# Patient Record
Sex: MALE | Race: WHITE | Employment: UNEMPLOYED | URBAN - METROPOLITAN AREA
[De-identification: names, ages, dates, MRNs, and addresses within clinical notes are randomized per-mention and may not be internally consistent; named-entity substitution may affect disease eponyms.]

---

## 2018-12-08 ENCOUNTER — APPOINTMENT (OUTPATIENT)
Dept: GENERAL RADIOLOGY | Facility: CLINIC | Age: 53
End: 2018-12-08
Attending: EMERGENCY MEDICINE
Payer: COMMERCIAL

## 2018-12-08 ENCOUNTER — APPOINTMENT (OUTPATIENT)
Dept: CT IMAGING | Facility: CLINIC | Age: 53
End: 2018-12-08
Attending: EMERGENCY MEDICINE
Payer: COMMERCIAL

## 2018-12-08 ENCOUNTER — HOSPITAL ENCOUNTER (EMERGENCY)
Facility: CLINIC | Age: 53
Discharge: ANOTHER HOSPITAL | End: 2018-12-08
Attending: EMERGENCY MEDICINE | Admitting: EMERGENCY MEDICINE
Payer: COMMERCIAL

## 2018-12-08 ENCOUNTER — HOSPITAL ENCOUNTER (INPATIENT)
Facility: CLINIC | Age: 53
LOS: 6 days | Discharge: NURSING, REHAB OR LONG-TERM CARE | End: 2018-12-14
Attending: EMERGENCY MEDICINE | Admitting: SURGERY
Payer: COMMERCIAL

## 2018-12-08 VITALS
OXYGEN SATURATION: 97 % | RESPIRATION RATE: 24 BRPM | SYSTOLIC BLOOD PRESSURE: 134 MMHG | TEMPERATURE: 97.3 F | DIASTOLIC BLOOD PRESSURE: 82 MMHG

## 2018-12-08 DIAGNOSIS — S42.002A CLOSED DISPLACED FRACTURE OF LEFT CLAVICLE, UNSPECIFIED PART OF CLAVICLE, INITIAL ENCOUNTER: ICD-10-CM

## 2018-12-08 DIAGNOSIS — F10.920 ALCOHOLIC INTOXICATION WITHOUT COMPLICATION (H): ICD-10-CM

## 2018-12-08 DIAGNOSIS — S27.0XXA TRAUMATIC PNEUMOTHORAX, INITIAL ENCOUNTER: ICD-10-CM

## 2018-12-08 DIAGNOSIS — F10.120 ALCOHOL ABUSE WITH UNCOMPLICATED INTOXICATION (H): ICD-10-CM

## 2018-12-08 DIAGNOSIS — S22.42XA CLOSED FRACTURE OF MULTIPLE RIBS OF LEFT SIDE, INITIAL ENCOUNTER: ICD-10-CM

## 2018-12-08 DIAGNOSIS — Z86.718 HISTORY OF DEEP VENOUS THROMBOSIS: ICD-10-CM
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
## 2018-12-08 DIAGNOSIS — K59.03 DRUG-INDUCED CONSTIPATION: Primary | ICD-10-CM
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
## 2018-12-08 LAB
ABO + RH BLD: NORMAL
ABO + RH BLD: NORMAL
ALBUMIN SERPL-MCNC: 4.1 G/DL (ref 3.4–5)
ALP SERPL-CCNC: 58 U/L (ref 40–150)
ALT SERPL W P-5'-P-CCNC: 33 U/L (ref 0–70)
ANION GAP SERPL CALCULATED.3IONS-SCNC: 9 MMOL/L (ref 3–14)
APTT PPP: 25 SEC (ref 22–37)
AST SERPL W P-5'-P-CCNC: 46 U/L (ref 0–45)
BASE DEFICIT BLDV-SCNC: 4.4 MMOL/L
BASOPHILS # BLD AUTO: 0 10E9/L (ref 0–0.2)
BASOPHILS # BLD AUTO: 0.1 10E9/L (ref 0–0.2)
BASOPHILS NFR BLD AUTO: 0.1 %
BASOPHILS NFR BLD AUTO: 0.5 %
BILIRUB DIRECT SERPL-MCNC: 0.2 MG/DL (ref 0–0.2)
BILIRUB SERPL-MCNC: 0.7 MG/DL (ref 0.2–1.3)
BLD GP AB SCN SERPL QL: NORMAL
BLOOD BANK CMNT PATIENT-IMP: NORMAL
BUN SERPL-MCNC: 16 MG/DL (ref 7–30)
CALCIUM SERPL-MCNC: 8 MG/DL (ref 8.5–10.1)
CHLORIDE SERPL-SCNC: 110 MMOL/L (ref 94–109)
CO2 SERPL-SCNC: 22 MMOL/L (ref 20–32)
CREAT BLD-MCNC: 1.5 MG/DL (ref 0.66–1.25)
CREAT SERPL-MCNC: 1.2 MG/DL (ref 0.66–1.25)
DIFFERENTIAL METHOD BLD: ABNORMAL
DIFFERENTIAL METHOD BLD: ABNORMAL
EOSINOPHIL # BLD AUTO: 0 10E9/L (ref 0–0.7)
EOSINOPHIL # BLD AUTO: 0.1 10E9/L (ref 0–0.7)
EOSINOPHIL NFR BLD AUTO: 0 %
EOSINOPHIL NFR BLD AUTO: 0.8 %
ERYTHROCYTE [DISTWIDTH] IN BLOOD BY AUTOMATED COUNT: 12.5 % (ref 10–15)
ERYTHROCYTE [DISTWIDTH] IN BLOOD BY AUTOMATED COUNT: 12.8 % (ref 10–15)
ETHANOL SERPL-MCNC: 0.24 G/DL
GFR SERPL CREATININE-BSD FRML MDRD: 49 ML/MIN/1.7M2
GFR SERPL CREATININE-BSD FRML MDRD: 64 ML/MIN/1.7M2
GLUCOSE SERPL-MCNC: 74 MG/DL (ref 70–99)
HCO3 BLDV-SCNC: 23 MMOL/L (ref 21–28)
HCT VFR BLD AUTO: 41.9 % (ref 40–53)
HCT VFR BLD AUTO: 46.4 % (ref 40–53)
HGB BLD-MCNC: 13.9 G/DL (ref 13.3–17.7)
HGB BLD-MCNC: 15.7 G/DL (ref 13.3–17.7)
IMM GRANULOCYTES # BLD: 0.1 10E9/L (ref 0–0.4)
IMM GRANULOCYTES # BLD: 0.1 10E9/L (ref 0–0.4)
IMM GRANULOCYTES NFR BLD: 0.2 %
IMM GRANULOCYTES NFR BLD: 1 %
INR PPP: 0.98 (ref 0.86–1.14)
LYMPHOCYTES # BLD AUTO: 1.3 10E9/L (ref 0.8–5.3)
LYMPHOCYTES # BLD AUTO: 2.4 10E9/L (ref 0.8–5.3)
LYMPHOCYTES NFR BLD AUTO: 19.8 %
LYMPHOCYTES NFR BLD AUTO: 6 %
MCH RBC QN AUTO: 31.3 PG (ref 26.5–33)
MCH RBC QN AUTO: 31.6 PG (ref 26.5–33)
MCHC RBC AUTO-ENTMCNC: 33.2 G/DL (ref 31.5–36.5)
MCHC RBC AUTO-ENTMCNC: 33.8 G/DL (ref 31.5–36.5)
MCV RBC AUTO: 92 FL (ref 78–100)
MCV RBC AUTO: 95 FL (ref 78–100)
MONOCYTES # BLD AUTO: 0.4 10E9/L (ref 0–1.3)
MONOCYTES # BLD AUTO: 0.7 10E9/L (ref 0–1.3)
MONOCYTES NFR BLD AUTO: 1.9 %
MONOCYTES NFR BLD AUTO: 5.5 %
NEUTROPHILS # BLD AUTO: 20.2 10E9/L (ref 1.6–8.3)
NEUTROPHILS # BLD AUTO: 9 10E9/L (ref 1.6–8.3)
NEUTROPHILS NFR BLD AUTO: 72.4 %
NEUTROPHILS NFR BLD AUTO: 91.8 %
NRBC # BLD AUTO: 0 10*3/UL
NRBC # BLD AUTO: 0 10*3/UL
NRBC BLD AUTO-RTO: 0 /100
NRBC BLD AUTO-RTO: 0 /100
O2/TOTAL GAS SETTING VFR VENT: 100 %
PCO2 BLDV: 52 MM HG (ref 40–50)
PH BLDV: 7.26 PH (ref 7.32–7.43)
PLATELET # BLD AUTO: 210 10E9/L (ref 150–450)
PLATELET # BLD AUTO: 280 10E9/L (ref 150–450)
PLATELET # BLD EST: ABNORMAL 10*3/UL
PO2 BLDV: 35 MM HG (ref 25–47)
POTASSIUM SERPL-SCNC: 3.7 MMOL/L (ref 3.4–5.3)
PROT SERPL-MCNC: 7.6 G/DL (ref 6.8–8.8)
RBC # BLD AUTO: 4.4 10E12/L (ref 4.4–5.9)
RBC # BLD AUTO: 5.02 10E12/L (ref 4.4–5.9)
SODIUM SERPL-SCNC: 141 MMOL/L (ref 133–144)
SPECIMEN EXP DATE BLD: NORMAL
TROPONIN I SERPL-MCNC: 0.04 UG/L (ref 0–0.04)
WBC # BLD AUTO: 12.4 10E9/L (ref 4–11)
WBC # BLD AUTO: 22 10E9/L (ref 4–11)

## 2018-12-08 PROCEDURE — 99284 EMERGENCY DEPT VISIT MOD MDM: CPT | Mod: Z6 | Performed by: EMERGENCY MEDICINE

## 2018-12-08 PROCEDURE — 25000128 H RX IP 250 OP 636

## 2018-12-08 PROCEDURE — 86900 BLOOD TYPING SEROLOGIC ABO: CPT | Performed by: EMERGENCY MEDICINE

## 2018-12-08 PROCEDURE — 99285 EMERGENCY DEPT VISIT HI MDM: CPT | Mod: 25 | Performed by: EMERGENCY MEDICINE

## 2018-12-08 PROCEDURE — 74177 CT ABD & PELVIS W/CONTRAST: CPT

## 2018-12-08 PROCEDURE — 0W9B30Z DRAINAGE OF LEFT PLEURAL CAVITY WITH DRAINAGE DEVICE, PERCUTANEOUS APPROACH: ICD-10-PCS | Performed by: EMERGENCY MEDICINE

## 2018-12-08 PROCEDURE — 99292 CRITICAL CARE ADDL 30 MIN: CPT

## 2018-12-08 PROCEDURE — 99291 CRITICAL CARE FIRST HOUR: CPT | Mod: 25

## 2018-12-08 PROCEDURE — 73130 X-RAY EXAM OF HAND: CPT | Mod: 50

## 2018-12-08 PROCEDURE — 84484 ASSAY OF TROPONIN QUANT: CPT | Performed by: EMERGENCY MEDICINE

## 2018-12-08 PROCEDURE — 40000826 ZZH STATISTIC TRAUMA CODE W/O ACCESS

## 2018-12-08 PROCEDURE — 96374 THER/PROPH/DIAG INJ IV PUSH: CPT | Mod: 59

## 2018-12-08 PROCEDURE — 85025 COMPLETE CBC W/AUTO DIFF WBC: CPT | Performed by: EMERGENCY MEDICINE

## 2018-12-08 PROCEDURE — 76604 US EXAM CHEST: CPT

## 2018-12-08 PROCEDURE — 40000986 XR CHEST PORT 1 VW

## 2018-12-08 PROCEDURE — 25000128 H RX IP 250 OP 636: Performed by: STUDENT IN AN ORGANIZED HEALTH CARE EDUCATION/TRAINING PROGRAM

## 2018-12-08 PROCEDURE — 68200001 ZZH PARTIAL TRAUMA W/O CC LEVEL II: Performed by: EMERGENCY MEDICINE

## 2018-12-08 PROCEDURE — 87640 STAPH A DNA AMP PROBE: CPT | Performed by: ANESTHESIOLOGY

## 2018-12-08 PROCEDURE — 90471 IMMUNIZATION ADMIN: CPT

## 2018-12-08 PROCEDURE — 20000004 ZZH R&B ICU UMMC

## 2018-12-08 PROCEDURE — 90715 TDAP VACCINE 7 YRS/> IM: CPT | Performed by: EMERGENCY MEDICINE

## 2018-12-08 PROCEDURE — 86901 BLOOD TYPING SEROLOGIC RH(D): CPT | Performed by: EMERGENCY MEDICINE

## 2018-12-08 PROCEDURE — 96375 TX/PRO/DX INJ NEW DRUG ADDON: CPT

## 2018-12-08 PROCEDURE — 87641 MR-STAPH DNA AMP PROBE: CPT | Performed by: ANESTHESIOLOGY

## 2018-12-08 PROCEDURE — 86850 RBC ANTIBODY SCREEN: CPT | Performed by: EMERGENCY MEDICINE

## 2018-12-08 PROCEDURE — 40000282 ZZH STATISTIC TRAUMA - NO PRIOR

## 2018-12-08 PROCEDURE — 72128 CT CHEST SPINE W/O DYE: CPT

## 2018-12-08 PROCEDURE — 85610 PROTHROMBIN TIME: CPT | Performed by: EMERGENCY MEDICINE

## 2018-12-08 PROCEDURE — 80320 DRUG SCREEN QUANTALCOHOLS: CPT | Performed by: EMERGENCY MEDICINE

## 2018-12-08 PROCEDURE — 96376 TX/PRO/DX INJ SAME DRUG ADON: CPT | Performed by: EMERGENCY MEDICINE

## 2018-12-08 PROCEDURE — 96376 TX/PRO/DX INJ SAME DRUG ADON: CPT

## 2018-12-08 PROCEDURE — 93005 ELECTROCARDIOGRAM TRACING: CPT

## 2018-12-08 PROCEDURE — 72131 CT LUMBAR SPINE W/O DYE: CPT

## 2018-12-08 PROCEDURE — 99291 CRITICAL CARE FIRST HOUR: CPT | Mod: GC | Performed by: ANESTHESIOLOGY

## 2018-12-08 PROCEDURE — 71045 X-RAY EXAM CHEST 1 VIEW: CPT

## 2018-12-08 PROCEDURE — 25000125 ZZHC RX 250: Performed by: EMERGENCY MEDICINE

## 2018-12-08 PROCEDURE — 96375 TX/PRO/DX INJ NEW DRUG ADDON: CPT | Performed by: EMERGENCY MEDICINE

## 2018-12-08 PROCEDURE — 72125 CT NECK SPINE W/O DYE: CPT

## 2018-12-08 PROCEDURE — 25000128 H RX IP 250 OP 636: Performed by: EMERGENCY MEDICINE

## 2018-12-08 PROCEDURE — 82803 BLOOD GASES ANY COMBINATION: CPT | Performed by: EMERGENCY MEDICINE

## 2018-12-08 PROCEDURE — 85730 THROMBOPLASTIN TIME PARTIAL: CPT | Performed by: EMERGENCY MEDICINE

## 2018-12-08 PROCEDURE — 76705 ECHO EXAM OF ABDOMEN: CPT

## 2018-12-08 PROCEDURE — 96361 HYDRATE IV INFUSION ADD-ON: CPT

## 2018-12-08 PROCEDURE — 82565 ASSAY OF CREATININE: CPT

## 2018-12-08 PROCEDURE — 80053 COMPREHEN METABOLIC PANEL: CPT | Performed by: EMERGENCY MEDICINE

## 2018-12-08 PROCEDURE — 70450 CT HEAD/BRAIN W/O DYE: CPT

## 2018-12-08 PROCEDURE — 82248 BILIRUBIN DIRECT: CPT | Performed by: EMERGENCY MEDICINE

## 2018-12-08 PROCEDURE — 40000275 ZZH STATISTIC RCP TIME EA 10 MIN

## 2018-12-08 PROCEDURE — 96374 THER/PROPH/DIAG INJ IV PUSH: CPT | Performed by: EMERGENCY MEDICINE

## 2018-12-08 RX ORDER — AMOXICILLIN 250 MG
1 CAPSULE ORAL 2 TIMES DAILY PRN
Status: DISCONTINUED | OUTPATIENT
Start: 2018-12-08 | End: 2018-12-09

## 2018-12-08 RX ORDER — PROCHLORPERAZINE MALEATE 10 MG
10 TABLET ORAL EVERY 6 HOURS PRN
Status: DISCONTINUED | OUTPATIENT
Start: 2018-12-08 | End: 2018-12-08

## 2018-12-08 RX ORDER — POLYETHYLENE GLYCOL 3350 17 G/17G
17 POWDER, FOR SOLUTION ORAL DAILY
Status: DISCONTINUED | OUTPATIENT
Start: 2018-12-09 | End: 2018-12-10

## 2018-12-08 RX ORDER — SODIUM CHLORIDE 9 MG/ML
INJECTION, SOLUTION INTRAVENOUS CONTINUOUS
Status: DISCONTINUED | OUTPATIENT
Start: 2018-12-08 | End: 2018-12-11

## 2018-12-08 RX ORDER — PROCHLORPERAZINE MALEATE 5 MG
10 TABLET ORAL EVERY 6 HOURS PRN
Status: DISCONTINUED | OUTPATIENT
Start: 2018-12-08 | End: 2018-12-14 | Stop reason: HOSPADM

## 2018-12-08 RX ORDER — ALBUTEROL SULFATE 0.83 MG/ML
2.5 SOLUTION RESPIRATORY (INHALATION) EVERY 4 HOURS PRN
Status: DISCONTINUED | OUTPATIENT
Start: 2018-12-08 | End: 2018-12-14 | Stop reason: HOSPADM

## 2018-12-08 RX ORDER — FENTANYL CITRATE 50 UG/ML
INJECTION, SOLUTION INTRAMUSCULAR; INTRAVENOUS
Status: COMPLETED
Start: 2018-12-08 | End: 2018-12-08

## 2018-12-08 RX ORDER — ONDANSETRON 4 MG/1
4 TABLET, ORALLY DISINTEGRATING ORAL EVERY 6 HOURS PRN
Status: DISCONTINUED | OUTPATIENT
Start: 2018-12-08 | End: 2018-12-08

## 2018-12-08 RX ORDER — ACETAMINOPHEN 325 MG/1
650 TABLET ORAL EVERY 4 HOURS PRN
Status: DISCONTINUED | OUTPATIENT
Start: 2018-12-11 | End: 2018-12-09

## 2018-12-08 RX ORDER — SODIUM CHLORIDE, SODIUM LACTATE, POTASSIUM CHLORIDE, CALCIUM CHLORIDE 600; 310; 30; 20 MG/100ML; MG/100ML; MG/100ML; MG/100ML
INJECTION, SOLUTION INTRAVENOUS CONTINUOUS
Status: DISCONTINUED | OUTPATIENT
Start: 2018-12-08 | End: 2018-12-11

## 2018-12-08 RX ORDER — LIDOCAINE HYDROCHLORIDE AND EPINEPHRINE 10; 10 MG/ML; UG/ML
INJECTION, SOLUTION INFILTRATION; PERINEURAL
Status: DISCONTINUED
Start: 2018-12-08 | End: 2018-12-08 | Stop reason: HOSPADM

## 2018-12-08 RX ORDER — FENTANYL CITRATE 50 UG/ML
50 INJECTION, SOLUTION INTRAMUSCULAR; INTRAVENOUS
Status: COMPLETED | OUTPATIENT
Start: 2018-12-08 | End: 2018-12-08

## 2018-12-08 RX ORDER — AMOXICILLIN 250 MG
2 CAPSULE ORAL 2 TIMES DAILY
Status: DISCONTINUED | OUTPATIENT
Start: 2018-12-08 | End: 2018-12-14 | Stop reason: HOSPADM

## 2018-12-08 RX ORDER — PROCHLORPERAZINE 25 MG
25 SUPPOSITORY, RECTAL RECTAL EVERY 12 HOURS PRN
Status: DISCONTINUED | OUTPATIENT
Start: 2018-12-08 | End: 2018-12-14 | Stop reason: HOSPADM

## 2018-12-08 RX ORDER — SODIUM CHLORIDE, SODIUM LACTATE, POTASSIUM CHLORIDE, CALCIUM CHLORIDE 600; 310; 30; 20 MG/100ML; MG/100ML; MG/100ML; MG/100ML
INJECTION, SOLUTION INTRAVENOUS
Status: COMPLETED
Start: 2018-12-08 | End: 2018-12-08

## 2018-12-08 RX ORDER — ACETAMINOPHEN 325 MG/1
975 TABLET ORAL EVERY 8 HOURS
Status: DISPENSED | OUTPATIENT
Start: 2018-12-08 | End: 2018-12-11

## 2018-12-08 RX ORDER — IOPAMIDOL 755 MG/ML
92 INJECTION, SOLUTION INTRAVASCULAR ONCE
Status: COMPLETED | OUTPATIENT
Start: 2018-12-08 | End: 2018-12-08

## 2018-12-08 RX ORDER — NALOXONE HYDROCHLORIDE 0.4 MG/ML
.1-.4 INJECTION, SOLUTION INTRAMUSCULAR; INTRAVENOUS; SUBCUTANEOUS
Status: DISCONTINUED | OUTPATIENT
Start: 2018-12-08 | End: 2018-12-14 | Stop reason: HOSPADM

## 2018-12-08 RX ORDER — ONDANSETRON 2 MG/ML
4 INJECTION INTRAMUSCULAR; INTRAVENOUS EVERY 6 HOURS PRN
Status: DISCONTINUED | OUTPATIENT
Start: 2018-12-08 | End: 2018-12-08

## 2018-12-08 RX ORDER — ONDANSETRON 2 MG/ML
4 INJECTION INTRAMUSCULAR; INTRAVENOUS EVERY 6 HOURS PRN
Status: DISCONTINUED | OUTPATIENT
Start: 2018-12-08 | End: 2018-12-14 | Stop reason: HOSPADM

## 2018-12-08 RX ORDER — AMOXICILLIN 250 MG
1 CAPSULE ORAL 2 TIMES DAILY
Status: DISCONTINUED | OUTPATIENT
Start: 2018-12-08 | End: 2018-12-14 | Stop reason: HOSPADM

## 2018-12-08 RX ORDER — HYDROMORPHONE HYDROCHLORIDE 1 MG/ML
0.5 INJECTION, SOLUTION INTRAMUSCULAR; INTRAVENOUS; SUBCUTANEOUS
Status: COMPLETED | OUTPATIENT
Start: 2018-12-08 | End: 2018-12-08

## 2018-12-08 RX ORDER — FENTANYL CITRATE 50 UG/ML
50 INJECTION, SOLUTION INTRAMUSCULAR; INTRAVENOUS ONCE
Status: COMPLETED | OUTPATIENT
Start: 2018-12-08 | End: 2018-12-08

## 2018-12-08 RX ORDER — AMOXICILLIN 250 MG
2 CAPSULE ORAL 2 TIMES DAILY PRN
Status: DISCONTINUED | OUTPATIENT
Start: 2018-12-08 | End: 2018-12-09

## 2018-12-08 RX ORDER — NALOXONE HYDROCHLORIDE 0.4 MG/ML
.1-.4 INJECTION, SOLUTION INTRAMUSCULAR; INTRAVENOUS; SUBCUTANEOUS
Status: DISCONTINUED | OUTPATIENT
Start: 2018-12-08 | End: 2018-12-08

## 2018-12-08 RX ORDER — KETAMINE HYDROCHLORIDE 10 MG/ML
10 INJECTION, SOLUTION INTRAMUSCULAR; INTRAVENOUS ONCE
Status: COMPLETED | OUTPATIENT
Start: 2018-12-08 | End: 2018-12-08

## 2018-12-08 RX ORDER — SODIUM CHLORIDE, SODIUM LACTATE, POTASSIUM CHLORIDE, CALCIUM CHLORIDE 600; 310; 30; 20 MG/100ML; MG/100ML; MG/100ML; MG/100ML
1000 INJECTION, SOLUTION INTRAVENOUS CONTINUOUS
Status: DISCONTINUED | OUTPATIENT
Start: 2018-12-08 | End: 2018-12-08

## 2018-12-08 RX ORDER — HYDROMORPHONE HYDROCHLORIDE 1 MG/ML
INJECTION, SOLUTION INTRAMUSCULAR; INTRAVENOUS; SUBCUTANEOUS
Status: COMPLETED
Start: 2018-12-08 | End: 2018-12-08

## 2018-12-08 RX ORDER — FENTANYL CITRATE 50 UG/ML
50 INJECTION, SOLUTION INTRAMUSCULAR; INTRAVENOUS
Status: DISCONTINUED | OUTPATIENT
Start: 2018-12-08 | End: 2018-12-12

## 2018-12-08 RX ORDER — ONDANSETRON 4 MG/1
4 TABLET, ORALLY DISINTEGRATING ORAL EVERY 6 HOURS PRN
Status: DISCONTINUED | OUTPATIENT
Start: 2018-12-08 | End: 2018-12-14 | Stop reason: HOSPADM

## 2018-12-08 RX ORDER — HYDROMORPHONE HYDROCHLORIDE 1 MG/ML
0.5 INJECTION, SOLUTION INTRAMUSCULAR; INTRAVENOUS; SUBCUTANEOUS ONCE
Status: COMPLETED | OUTPATIENT
Start: 2018-12-08 | End: 2018-12-08

## 2018-12-08 RX ADMIN — Medication 1 MG: at 16:29

## 2018-12-08 RX ADMIN — FENTANYL CITRATE 50 MCG: 50 INJECTION, SOLUTION INTRAMUSCULAR; INTRAVENOUS at 19:48

## 2018-12-08 RX ADMIN — SODIUM CHLORIDE, POTASSIUM CHLORIDE, SODIUM LACTATE AND CALCIUM CHLORIDE 2000 ML: 600; 310; 30; 20 INJECTION, SOLUTION INTRAVENOUS at 16:53

## 2018-12-08 RX ADMIN — FENTANYL CITRATE 50 MCG: 50 INJECTION INTRAMUSCULAR; INTRAVENOUS at 17:28

## 2018-12-08 RX ADMIN — FENTANYL CITRATE 50 MCG: 50 INJECTION, SOLUTION INTRAMUSCULAR; INTRAVENOUS at 15:53

## 2018-12-08 RX ADMIN — IOPAMIDOL 92 ML: 755 INJECTION, SOLUTION INTRAVENOUS at 16:28

## 2018-12-08 RX ADMIN — SODIUM CHLORIDE, PRESERVATIVE FREE 63 ML: 5 INJECTION INTRAVENOUS at 16:28

## 2018-12-08 RX ADMIN — FENTANYL CITRATE 50 MCG: 50 INJECTION, SOLUTION INTRAMUSCULAR; INTRAVENOUS at 21:48

## 2018-12-08 RX ADMIN — FENTANYL CITRATE 50 MCG: 50 INJECTION, SOLUTION INTRAMUSCULAR; INTRAVENOUS at 15:49

## 2018-12-08 RX ADMIN — Medication 0.5 MG: at 22:58

## 2018-12-08 RX ADMIN — SODIUM CHLORIDE 2000 ML: 9 INJECTION, SOLUTION INTRAVENOUS at 15:38

## 2018-12-08 RX ADMIN — FENTANYL CITRATE 50 MCG: 50 INJECTION, SOLUTION INTRAMUSCULAR; INTRAVENOUS at 17:28

## 2018-12-08 RX ADMIN — Medication 0.5 MG: at 23:01

## 2018-12-08 RX ADMIN — KETAMINE HYDROCHLORIDE 10 MG: 10 INJECTION, SOLUTION INTRAMUSCULAR; INTRAVENOUS at 19:49

## 2018-12-08 RX ADMIN — FENTANYL CITRATE 50 MCG: 50 INJECTION, SOLUTION INTRAMUSCULAR; INTRAVENOUS at 16:59

## 2018-12-08 RX ADMIN — SODIUM CHLORIDE, POTASSIUM CHLORIDE, SODIUM LACTATE AND CALCIUM CHLORIDE: 600; 310; 30; 20 INJECTION, SOLUTION INTRAVENOUS at 22:58

## 2018-12-08 RX ADMIN — CLOSTRIDIUM TETANI TOXOID ANTIGEN (FORMALDEHYDE INACTIVATED), CORYNEBACTERIUM DIPHTHERIAE TOXOID ANTIGEN (FORMALDEHYDE INACTIVATED), BORDETELLA PERTUSSIS TOXOID ANTIGEN (GLUTARALDEHYDE INACTIVATED), BORDETELLA PERTUSSIS FILAMENTOUS HEMAGGLUTININ ANTIGEN (FORMALDEHYDE INACTIVATED), BORDETELLA PERTUSSIS PERTACTIN ANTIGEN, AND BORDETELLA PERTUSSIS FIMBRIAE 2/3 ANTIGEN 0.5 ML: 5; 2; 2.5; 5; 3; 5 INJECTION, SUSPENSION INTRAMUSCULAR at 16:31

## 2018-12-08 RX ADMIN — Medication 0.5 MG: at 16:01

## 2018-12-08 RX ADMIN — FENTANYL CITRATE 50 MCG: 50 INJECTION, SOLUTION INTRAMUSCULAR; INTRAVENOUS at 17:09

## 2018-12-08 RX ADMIN — Medication: at 22:55

## 2018-12-08 ASSESSMENT — ACTIVITIES OF DAILY LIVING (ADL)
AMBULATION: 0-->INDEPENDENT
RETIRED_EATING: 0-->INDEPENDENT
DRESS: 0-->INDEPENDENT
SWALLOWING: 0-->SWALLOWS FOODS/LIQUIDS WITHOUT DIFFICULTY
RETIRED_COMMUNICATION: 0-->UNDERSTANDS/COMMUNICATES WITHOUT DIFFICULTY
COGNITION: 0 - NO COGNITION ISSUES REPORTED
TRANSFERRING: 0-->INDEPENDENT
FALL_HISTORY_WITHIN_LAST_SIX_MONTHS: NO
TOILETING: 0-->INDEPENDENT
BATHING: 0-->INDEPENDENT

## 2018-12-08 ASSESSMENT — ENCOUNTER SYMPTOMS
ABDOMINAL PAIN: 0
SHORTNESS OF BREATH: 1
VOMITING: 0
WOUND: 1
ABDOMINAL PAIN: 0
NECK PAIN: 1
WOUND: 1
MYALGIAS: 1
BACK PAIN: 1
NAUSEA: 0
ARTHRALGIAS: 1
NUMBNESS: 0
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
## 2018-12-09 ENCOUNTER — APPOINTMENT (OUTPATIENT)
Dept: OCCUPATIONAL THERAPY | Facility: CLINIC | Age: 53
End: 2018-12-09
Attending: ANESTHESIOLOGY
Payer: COMMERCIAL

## 2018-12-09 ENCOUNTER — ANESTHESIA EVENT (OUTPATIENT)
Dept: INTENSIVE CARE | Facility: CLINIC | Age: 53
End: 2018-12-09
Payer: COMMERCIAL

## 2018-12-09 ENCOUNTER — ANESTHESIA (OUTPATIENT)
Dept: INTENSIVE CARE | Facility: CLINIC | Age: 53
End: 2018-12-09
Payer: COMMERCIAL

## 2018-12-09 LAB
ANION GAP SERPL CALCULATED.3IONS-SCNC: 10 MMOL/L (ref 3–14)
BUN SERPL-MCNC: 18 MG/DL (ref 7–30)
CALCIUM SERPL-MCNC: 7.5 MG/DL (ref 8.5–10.1)
CHLORIDE SERPL-SCNC: 100 MMOL/L (ref 94–109)
CO2 SERPL-SCNC: 24 MMOL/L (ref 20–32)
CREAT SERPL-MCNC: 0.95 MG/DL (ref 0.66–1.25)
ERYTHROCYTE [DISTWIDTH] IN BLOOD BY AUTOMATED COUNT: 12.6 % (ref 10–15)
GFR SERPL CREATININE-BSD FRML MDRD: 84 ML/MIN/1.7M2
GGT SERPL-CCNC: 14 U/L (ref 0–75)
GLUCOSE SERPL-MCNC: 72 MG/DL (ref 70–99)
HCT VFR BLD AUTO: 40.5 % (ref 40–53)
HGB BLD-MCNC: 13.6 G/DL (ref 13.3–17.7)
INR PPP: 1.25 (ref 0.86–1.14)
MAGNESIUM SERPL-MCNC: 2 MG/DL (ref 1.6–2.3)
MCH RBC QN AUTO: 31.6 PG (ref 26.5–33)
MCHC RBC AUTO-ENTMCNC: 33.6 G/DL (ref 31.5–36.5)
MCV RBC AUTO: 94 FL (ref 78–100)
MRSA DNA SPEC QL NAA+PROBE: NEGATIVE
PHOSPHATE SERPL-MCNC: 3.9 MG/DL (ref 2.5–4.5)
PLATELET # BLD AUTO: 206 10E9/L (ref 150–450)
POTASSIUM SERPL-SCNC: 4.5 MMOL/L (ref 3.4–5.3)
RBC # BLD AUTO: 4.31 10E12/L (ref 4.4–5.9)
SODIUM SERPL-SCNC: 135 MMOL/L (ref 133–144)
SPECIMEN SOURCE: NORMAL
TROPONIN I SERPL-MCNC: 0.04 UG/L (ref 0–0.04)
WBC # BLD AUTO: 15.8 10E9/L (ref 4–11)

## 2018-12-09 PROCEDURE — 25000132 ZZH RX MED GY IP 250 OP 250 PS 637: Performed by: STUDENT IN AN ORGANIZED HEALTH CARE EDUCATION/TRAINING PROGRAM

## 2018-12-09 PROCEDURE — 25000128 H RX IP 250 OP 636: Performed by: NURSE PRACTITIONER

## 2018-12-09 PROCEDURE — 99233 SBSQ HOSP IP/OBS HIGH 50: CPT | Performed by: NURSE PRACTITIONER

## 2018-12-09 PROCEDURE — 85610 PROTHROMBIN TIME: CPT | Performed by: NURSE PRACTITIONER

## 2018-12-09 PROCEDURE — 40000275 ZZH STATISTIC RCP TIME EA 10 MIN

## 2018-12-09 PROCEDURE — 97165 OT EVAL LOW COMPLEX 30 MIN: CPT | Mod: GO | Performed by: OCCUPATIONAL THERAPIST

## 2018-12-09 PROCEDURE — 40000671 ZZH STATISTIC ANESTHESIA CASE

## 2018-12-09 PROCEDURE — 36415 COLL VENOUS BLD VENIPUNCTURE: CPT | Performed by: NURSE PRACTITIONER

## 2018-12-09 PROCEDURE — 94150 VITAL CAPACITY TEST: CPT

## 2018-12-09 PROCEDURE — 25000125 ZZHC RX 250: Performed by: STUDENT IN AN ORGANIZED HEALTH CARE EDUCATION/TRAINING PROGRAM

## 2018-12-09 PROCEDURE — 25000125 ZZHC RX 250: Performed by: ANESTHESIOLOGY

## 2018-12-09 PROCEDURE — 3E0R3BZ INTRODUCTION OF ANESTHETIC AGENT INTO SPINAL CANAL, PERCUTANEOUS APPROACH: ICD-10-PCS | Performed by: ORTHOPAEDIC SURGERY

## 2018-12-09 PROCEDURE — 84100 ASSAY OF PHOSPHORUS: CPT | Performed by: NURSE PRACTITIONER

## 2018-12-09 PROCEDURE — 20000004 ZZH R&B ICU UMMC

## 2018-12-09 PROCEDURE — 40000133 ZZH STATISTIC OT WARD VISIT: Performed by: OCCUPATIONAL THERAPIST

## 2018-12-09 PROCEDURE — 25000128 H RX IP 250 OP 636: Performed by: EMERGENCY MEDICINE

## 2018-12-09 PROCEDURE — 83735 ASSAY OF MAGNESIUM: CPT | Performed by: NURSE PRACTITIONER

## 2018-12-09 PROCEDURE — 25000132 ZZH RX MED GY IP 250 OP 250 PS 637: Performed by: NURSE PRACTITIONER

## 2018-12-09 PROCEDURE — HZ2ZZZZ DETOXIFICATION SERVICES FOR SUBSTANCE ABUSE TREATMENT: ICD-10-PCS | Performed by: ORTHOPAEDIC SURGERY

## 2018-12-09 PROCEDURE — 80048 BASIC METABOLIC PNL TOTAL CA: CPT | Performed by: NURSE PRACTITIONER

## 2018-12-09 PROCEDURE — 27110038 ZZH RX 271: Performed by: ANESTHESIOLOGY

## 2018-12-09 PROCEDURE — 85027 COMPLETE CBC AUTOMATED: CPT | Performed by: NURSE PRACTITIONER

## 2018-12-09 PROCEDURE — 84484 ASSAY OF TROPONIN QUANT: CPT | Performed by: NURSE PRACTITIONER

## 2018-12-09 PROCEDURE — 82977 ASSAY OF GGT: CPT | Performed by: NURSE PRACTITIONER

## 2018-12-09 PROCEDURE — 25000128 H RX IP 250 OP 636: Performed by: STUDENT IN AN ORGANIZED HEALTH CARE EDUCATION/TRAINING PROGRAM

## 2018-12-09 PROCEDURE — 97110 THERAPEUTIC EXERCISES: CPT | Mod: GO | Performed by: OCCUPATIONAL THERAPIST

## 2018-12-09 RX ORDER — GABAPENTIN 300 MG/1
300 CAPSULE ORAL 3 TIMES DAILY
Status: DISCONTINUED | OUTPATIENT
Start: 2018-12-09 | End: 2018-12-11

## 2018-12-09 RX ORDER — HYDROXYZINE HYDROCHLORIDE 25 MG/1
25 TABLET, FILM COATED ORAL EVERY 6 HOURS PRN
Status: DISCONTINUED | OUTPATIENT
Start: 2018-12-09 | End: 2018-12-14 | Stop reason: HOSPADM

## 2018-12-09 RX ORDER — PANTOPRAZOLE SODIUM 40 MG/1
40 TABLET, DELAYED RELEASE ORAL
Status: DISCONTINUED | OUTPATIENT
Start: 2018-12-09 | End: 2018-12-10

## 2018-12-09 RX ORDER — BUPIVACAINE HYDROCHLORIDE AND EPINEPHRINE 2.5; 5 MG/ML; UG/ML
INJECTION, SOLUTION INFILTRATION; PERINEURAL PRN
Status: DISCONTINUED | OUTPATIENT
Start: 2018-12-09 | End: 2018-12-09

## 2018-12-09 RX ORDER — HYDROXYZINE HYDROCHLORIDE 25 MG/1
50 TABLET, FILM COATED ORAL EVERY 6 HOURS PRN
Status: DISCONTINUED | OUTPATIENT
Start: 2018-12-09 | End: 2018-12-14 | Stop reason: HOSPADM

## 2018-12-09 RX ORDER — METHOCARBAMOL 500 MG/1
1000 TABLET, FILM COATED ORAL 4 TIMES DAILY
Status: DISCONTINUED | OUTPATIENT
Start: 2018-12-09 | End: 2018-12-14 | Stop reason: HOSPADM

## 2018-12-09 RX ORDER — FOLIC ACID 1 MG/1
1 TABLET ORAL DAILY
Status: DISCONTINUED | OUTPATIENT
Start: 2018-12-09 | End: 2018-12-14 | Stop reason: HOSPADM

## 2018-12-09 RX ORDER — MECLIZINE HYDROCHLORIDE 25 MG/1
12.5 TABLET ORAL PRN
COMMUNITY
Start: 2018-10-03 | End: 2019-03-07

## 2018-12-09 RX ORDER — LANOLIN ALCOHOL/MO/W.PET/CERES
200 CREAM (GRAM) TOPICAL DAILY
Status: DISCONTINUED | OUTPATIENT
Start: 2018-12-14 | End: 2018-12-14 | Stop reason: HOSPADM

## 2018-12-09 RX ORDER — MULTIVITAMIN,THERAPEUTIC
1 TABLET ORAL
Status: DISCONTINUED | OUTPATIENT
Start: 2018-12-10 | End: 2018-12-14 | Stop reason: HOSPADM

## 2018-12-09 RX ADMIN — SENNOSIDES AND DOCUSATE SODIUM 2 TABLET: 8.6; 5 TABLET ORAL at 20:58

## 2018-12-09 RX ADMIN — GABAPENTIN 300 MG: 300 CAPSULE ORAL at 14:30

## 2018-12-09 RX ADMIN — BUPIVACAINE HYDROCHLORIDE AND EPINEPHRINE BITARTRATE 30 ML: 2.5; .005 INJECTION, SOLUTION INFILTRATION; PERINEURAL at 13:50

## 2018-12-09 RX ADMIN — THIAMINE HYDROCHLORIDE 250 MG: 100 INJECTION, SOLUTION INTRAMUSCULAR; INTRAVENOUS at 12:53

## 2018-12-09 RX ADMIN — SENNOSIDES AND DOCUSATE SODIUM 1 TABLET: 8.6; 5 TABLET ORAL at 08:00

## 2018-12-09 RX ADMIN — METHOCARBAMOL TABLETS 1000 MG: 500 TABLET, COATED ORAL at 16:35

## 2018-12-09 RX ADMIN — Medication 5 MG/HR: at 01:10

## 2018-12-09 RX ADMIN — METHOCARBAMOL TABLETS 1000 MG: 500 TABLET, COATED ORAL at 20:58

## 2018-12-09 RX ADMIN — POLYETHYLENE GLYCOL 3350 17 G: 17 POWDER, FOR SOLUTION ORAL at 08:00

## 2018-12-09 RX ADMIN — ACETAMINOPHEN 975 MG: 325 TABLET, FILM COATED ORAL at 16:35

## 2018-12-09 RX ADMIN — Medication: at 18:39

## 2018-12-09 RX ADMIN — METHOCARBAMOL TABLETS 1000 MG: 500 TABLET, COATED ORAL at 11:05

## 2018-12-09 RX ADMIN — Medication 5 MG/HR: at 13:07

## 2018-12-09 RX ADMIN — PANTOPRAZOLE SODIUM 40 MG: 40 TABLET, DELAYED RELEASE ORAL at 11:30

## 2018-12-09 RX ADMIN — ACETAMINOPHEN 975 MG: 325 TABLET, FILM COATED ORAL at 08:00

## 2018-12-09 RX ADMIN — FOLIC ACID 1 MG: 1 TABLET ORAL at 12:53

## 2018-12-09 RX ADMIN — HYDROXYZINE HYDROCHLORIDE 25 MG: 25 TABLET ORAL at 11:04

## 2018-12-09 RX ADMIN — GABAPENTIN 300 MG: 300 CAPSULE ORAL at 20:58

## 2018-12-09 RX ADMIN — FENTANYL CITRATE 50 MCG: 50 INJECTION, SOLUTION INTRAMUSCULAR; INTRAVENOUS at 13:31

## 2018-12-09 RX ADMIN — SODIUM CHLORIDE, POTASSIUM CHLORIDE, SODIUM LACTATE AND CALCIUM CHLORIDE: 600; 310; 30; 20 INJECTION, SOLUTION INTRAVENOUS at 11:18

## 2018-12-09 ASSESSMENT — ACTIVITIES OF DAILY LIVING (ADL)
ADLS_ACUITY_SCORE: 15
PREVIOUS_RESPONSIBILITIES: MEAL PREP;HOUSEKEEPING;LAUNDRY;SHOPPING;MEDICATION MANAGEMENT;FINANCES;DRIVING;WORK
ADLS_ACUITY_SCORE: 15

## 2018-12-09 ASSESSMENT — ENCOUNTER SYMPTOMS
NERVOUS/ANXIOUS: 1
NECK STIFFNESS: 1
ACTIVITY CHANGE: 1
BACK PAIN: 1
NEUROLOGICAL NEGATIVE: 1
WOUND: 1
ABDOMINAL PAIN: 1

## 2018-12-09 ASSESSMENT — PAIN DESCRIPTION - DESCRIPTORS: DESCRIPTORS: SORE

## 2018-12-09 ASSESSMENT — LIFESTYLE VARIABLES: TOBACCO_USE: 1
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
## 2018-12-09 NOTE — ANESTHESIA POSTPROCEDURE EVALUATION
Anesthesia POST Procedure Evaluation    Patient: Quang Carolina   MRN:     [id number] Gender:   male   Age:    53 year old :      1965        Preoperative Diagnosis: * No pre-op diagnosis entered *   * No procedures listed *   Postop Comments: No value filed.       Anesthesia Type:  Regional    Reportable Event: NO     PAIN: Uncomplicated   Sign Out status: Comfortable, Well controlled pain     PONV: No PONV   Sign Out status:  No Nausea or Vomiting     Neuro/Psych: Uneventful perioperative course   Sign Out Status: Preoperative baseline; Age appropriate mentation     Airway/Resp.: Uneventful perioperative course   Sign Out Status: Non labored breathing, age appropriate RR; Resp. Status within EXPECTED Parameters     CV: Uneventful perioperative course   Sign Out status: Appropriate BP and perfusion indices; Appropriate HR/Rhythm     Disposition:   Sign Out in:  ICU  Disposition:  ICU  Recovery Course: Recovery in ICU  Follow-Up: Not required           Last Anesthesia Record Vitals:      Last PACU/Preop Vitals:  Vitals:    18 1000 18 1100 18 1200   BP: 119/73 114/70 115/66   Resp:      Temp:   37  C (98.6  F)   SpO2: 95% 97% 95%         Electronically Signed By: Velasquez Castro DO, 2018, 2:09 PM

## 2018-12-09 NOTE — PLAN OF CARE
Discharge Planner OT  4A yanet  Patient plan for discharge: home  Current status: Pt NWB L UE, facilitated up to chair for OOB ADLs with min A and VC for precautions.  Pt c/o severe pain, sling L UE.   Barriers to return to prior living situation: medical needs, precautions  Recommendations for discharge: TCU although may be able to disc  to home w A pending progress and pain control.  Rationale for recommendations: Pt needing assist and compensatory techniques for ADLs       Entered by: Radha Taylor 12/09/2018 4:04 PM

## 2018-12-09 NOTE — ED TRIAGE NOTES
Transfer from [facility]. 18g x2 on bilateral arms. Has received a total of 3.5L of fluid. 1.5 mg total of dilaudid. 250 of fentanyl. SBP in 120's en route. 1mg ativan d/t anxiety given en route. C collar wasn't cleared at [facility] so collar in place. Sats 91% on room air. EMS notes deformity on R hand, stating R hand was not imaged at [facility].
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
## 2018-12-09 NOTE — PROCEDURES
Trauma Surgery Procedure Note  12/08/18    Procedure: L tube thoracostomy  Preprocedure Diagnosis: Rib fractures, traumatic pneumothorax  Postprocedure Diagnosis: Same  Procedure: Left sided chest tube placement .  (32F in 4th intercostal space at anterior axillary line.  Secured at 18 cm.)    Premed: 50 mcg Fentanyl; 10 mg ketamine  Local Anesthesia: 30 ml 1% lidocaine w/epi    After obtaining signed consent, Quang Carolina was prepped and draped in the usual fashion. A timeout was completed to confirm the correct patient and laterality. Lidocaine w/epinephrine was locally injected into the subcutaneous tissue as well as periostium and the pleural space. An incision was made at the skin, and a clamp was used to create a track from the skin to the pleural space. The pleural space was then entered with a Shannon clamp and the chest tube guided in a posterior and superior direction. There was immediate evacuation of approximately 100 cc of blood and a large amount of air. The tube was secured to the skin with 2-0 silk and placed to suction.  The patient tolerated the procedure well, and there were no complications.     A STAT CXR was ordered and confirmed proper placement.    Mir Lopez MD   Trauma [facility]
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
## 2018-12-09 NOTE — ANESTHESIA PROCEDURE NOTES
Peripheral Nerve Block Procedure Note    Staff:     Anesthesiologist:  Velasquez Castro DO    Resident/CRNA:  Marimar Olguin MD    Block performed by resident/CRNA in the presence of a teaching physician    Location: Floor  Procedure Start/Stop TImes:      12/9/2018 1:18 PM     12/9/2018 1:50 PM    patient identified, IV checked, site marked, risks and benefits discussed, informed consent, monitors and equipment checked, pre-op evaluation, at physician/surgeon's request and post-op pain management      Correct Patient: Yes      Correct Position: Yes      Correct Site: Yes      Correct Procedure: Yes      Correct Laterality:  Yes    Site Marked:  Yes  Procedure details:     Procedure:  Other (Erector Spinae)    ASA:  1    Diagnosis:  Rib fractures    Laterality:  Left    Position:  Sitting    Sterile Prep: patient draped, mask and sterile gloves      Local skin infiltration:  2% lidocaine    amount (mL):  2    Insertion Site:  T5-6    Needle:  Touhy needle    Needle gauge:  17    Needle length (inches):  3.1    Catheter gauge:  19    Catheter threaded easily: Yes      Threaded to cm at skin:  7    Ultrasound: Yes      Ultrasound used to identify targeted nerve, plexus, or vascular structure and placed a needle adjacent to it      Permanent Image entered into patiient's record      Abnormal pain on injection: No      Blood Aspirated: No      Paresthesias:  No    Bleeding at site: No      Test dose local:   Lidocaine 1.5% w/ 1:200,000 epinephrine    Test dose negative for signs of intravascular injection: Yes      Bolus via:  Catheter    Infusion Method:  Continuous Infusion    Blood aspirated via catheter: No      Secured:  Dermabond and Tegaderm    Complications:  None  Assessment/Narrative:      Informed consent obtained.  All risks and benefits of the nerve block discussed with the patient.  All questions answered and all parties agreed with the plan.   Block was placed at the surgeon's request for post  operative pain control.

## 2018-12-09 NOTE — PROGRESS NOTES
Neuro- A&Ox4. PERRLA. Moves all extremities. LUE with limited mobility due to fracture. Denies N/T  CV- NSR 80s-ST 100s. No ectopy. BP stable.  Pulm- On 3-4L NC. CT on left with bright red output. Educated re: incentive spirometer and he has been using it without prompting today.   GI- tolerating regular diet. Passing flatus.  - Voids in urinal. Output adequate.   Pain- Started on ketamine drip and dilaudid PCA. Block administered around 1400, pump arrived to floor @1800 and is now running. Will begin to wean ketamine drip. Robaxin and atarax started with good result.

## 2018-12-09 NOTE — ANESTHESIA PREPROCEDURE EVALUATION
Anesthesia Pre-Procedure Evaluation    Patient: Quang Carolina   MRN:     [id number] Gender:   male   Age:    53 year old :      1965        Preoperative Diagnosis: * No pre-op diagnosis entered *   * No procedures listed *     History reviewed. No pertinent past medical history.   History reviewed. No pertinent surgical history.       Anesthesia Evaluation     .             ROS/MED HX    ENT/Pulmonary:     (+)tobacco use, Current use , . .    Neurologic:  - neg neurologic ROS     Cardiovascular:     (+) ----. : . . . :. . Previous cardiac testing date:results:date: results:ECG reviewed date: results:NSR date: results:          METS/Exercise Tolerance:  >4 METS   Hematologic:  - neg hematologic  ROS       Musculoskeletal:  - neg musculoskeletal ROS       GI/Hepatic:  - neg GI/hepatic ROS       Renal/Genitourinary:  - ROS Renal section negative       Endo:  - neg endo ROS       Psychiatric:  - neg psychiatric ROS       Infectious Disease:  - neg infectious disease ROS       Malignancy:      - no malignancy   Other:    - neg other ROS                     PHYSICAL EXAM:   Mental Status/Neuro: A/A/O   Airway: Facies: Feasible  Mallampati: I  Mouth/Opening: Full  TM distance: > 6 cm  Neck ROM: Full   Respiratory: Auscultation: CTAB     Resp. Rate: Normal     Resp. Effort: Normal      CV: Rhythm: Regular  Rate: Age appropriate  Heart: Normal Sounds   Comments:      Dental: Normal                  Lab Results   Component Value Date    WBC 22.0 (H) 2018    HGB 13.9 2018    HCT 41.9 2018     2018     2018    POTASSIUM 3.7 2018    CHLORIDE 110 (H) 2018    CO2 22 2018    BUN 16 2018    CR 1.20 2018    GLC 74 2018    JAYDEN 8.0 (L) 2018    ALBUMIN 4.1 2018    PROTTOTAL 7.6 2018    ALT 33 2018    AST 46 (H) 2018    ALKPHOS 58 2018    BILITOTAL 0.7 2018    PTT 25 2018    INR 0.98 2018  "      Preop Vitals  BP Readings from Last 3 Encounters:   12/09/18 115/66   12/08/18 134/82   02/01/14 120/90    Pulse Readings from Last 3 Encounters:   02/01/14 80   08/25/09 76   04/13/09 76      Resp Readings from Last 3 Encounters:   12/09/18 15   12/08/18 24    SpO2 Readings from Last 3 Encounters:   12/09/18 95%   12/08/18 97%   02/01/14 96%      Temp Readings from Last 1 Encounters:   12/09/18 37  C (98.6  F) (Axillary)    Ht Readings from Last 1 Encounters:   08/25/09 1.803 m (5' 11\")      Wt Readings from Last 1 Encounters:   12/09/18 82.9 kg (182 lb 12.2 oz)    Estimated body mass index is 25.49 kg/m  as calculated from the following:    Height as of 8/25/09: 1.803 m (5' 11\").    Weight as of this encounter: 82.9 kg (182 lb 12.2 oz).     LDA:  Peripheral IV Left Upper forearm (Active)   Site Assessment [facility] 12/9/2018 12:00 PM   Line Status Infusing 12/9/2018 12:00 PM   Phlebitis Scale 0-->no symptoms 12/9/2018 12:00 PM   Infiltration Scale 0 12/9/2018 12:00 PM   Extravasation? No 12/9/2018 12:00 PM   Number of days:        Peripheral IV Right Upper forearm (Active)   Site Assessment L 12/9/2018 12:00 PM   Line Status Infusing 12/9/2018 12:00 PM   Phlebitis Scale 0-->no symptoms 12/9/2018 12:00 PM   Infiltration Scale 0 12/9/2018  8:00 AM   Extravasation? No 12/9/2018 12:00 PM   Number of days:        Chest Tube 1 Left (Active)   Site Assessment UTV 12/9/2018 12:00 PM   Suction -20 cm H2O 12/9/2018 12:00 PM   Chest Tube Airleak No 12/9/2018 12:00 PM   Drainage Description Bright red 12/9/2018 12:00 PM   Dressing Status Normal: Clean, Dry & Intact 12/9/2018 12:00 PM   Dressing Intervention Gauze 12/9/2018 12:00 PM   Patency Intervention Tip/Tilt 12/9/2018 12:00 PM   Chest Tube Clamps at Bedside present 12/9/2018 12:00 PM   Container Amount 330 12/9/2018 12:00 PM   Output (ml) 30 ml 12/9/2018 12:00 PM   Number of days: 1            Assessment:   ASA SCORE: 1    NPO Status: > 6 hours since completed Solid " Foods   Documentation: H&P complete; Preop Testing complete; Consents complete   Proceeding: Proceed without further delay  Tobacco Use:  NO Active use of Tobacco/UNKNOWN Tobacco use status     Plan:   Anes. Type:  Regional     RA Details:  Catheter; L     RA-Location/Type: Plane Block; Erector Spinae   Pre-Induction: None         Access/Monitoring: PIV              PONV Management:  Adult Risk Factors:, Non-Smoker  PONV Prevention Adult: procedure only.     CONSENT: Direct conversation   Plan and risks discussed with: Patient   Blood Products: Consent Deferred (Minimal Blood Loss)       Comments for Plan/Consent:  Erector spinae left block  Procedure only                           Velasquez Castro DO
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
## 2018-12-09 NOTE — PROGRESS NOTES
Barcode scanner is not working. IT called but are not available on Sundays to come fix it. They will be by tomorrow. RN will bypass medications today but will verify controlled substances with another RN before administration.

## 2018-12-10 ENCOUNTER — APPOINTMENT (OUTPATIENT)
Dept: PHYSICAL THERAPY | Facility: CLINIC | Age: 53
End: 2018-12-10
Attending: ANESTHESIOLOGY
Payer: COMMERCIAL

## 2018-12-10 ENCOUNTER — APPOINTMENT (OUTPATIENT)
Dept: GENERAL RADIOLOGY | Facility: CLINIC | Age: 53
End: 2018-12-10
Attending: NURSE PRACTITIONER
Payer: COMMERCIAL

## 2018-12-10 ENCOUNTER — APPOINTMENT (OUTPATIENT)
Dept: GENERAL RADIOLOGY | Facility: CLINIC | Age: 53
End: 2018-12-10
Payer: COMMERCIAL

## 2018-12-10 ENCOUNTER — APPOINTMENT (OUTPATIENT)
Dept: GENERAL RADIOLOGY | Facility: CLINIC | Age: 53
End: 2018-12-10
Attending: SURGERY
Payer: COMMERCIAL

## 2018-12-10 LAB
ANION GAP SERPL CALCULATED.3IONS-SCNC: 7 MMOL/L (ref 3–14)
BUN SERPL-MCNC: 15 MG/DL (ref 7–30)
CALCIUM SERPL-MCNC: 7.8 MG/DL (ref 8.5–10.1)
CHLORIDE SERPL-SCNC: 100 MMOL/L (ref 94–109)
CO2 SERPL-SCNC: 29 MMOL/L (ref 20–32)
CREAT SERPL-MCNC: 0.93 MG/DL (ref 0.66–1.25)
ERYTHROCYTE [DISTWIDTH] IN BLOOD BY AUTOMATED COUNT: 12.7 % (ref 10–15)
GFR SERPL CREATININE-BSD FRML MDRD: 86 ML/MIN/1.7M2
GLUCOSE SERPL-MCNC: 143 MG/DL (ref 70–99)
HCT VFR BLD AUTO: 39 % (ref 40–53)
HGB BLD-MCNC: 13 G/DL (ref 13.3–17.7)
INTERPRETATION ECG - MUSE: NORMAL
MCH RBC QN AUTO: 31.2 PG (ref 26.5–33)
MCHC RBC AUTO-ENTMCNC: 33.3 G/DL (ref 31.5–36.5)
MCV RBC AUTO: 94 FL (ref 78–100)
PLATELET # BLD AUTO: 186 10E9/L (ref 150–450)
POTASSIUM SERPL-SCNC: 4 MMOL/L (ref 3.4–5.3)
RBC # BLD AUTO: 4.17 10E12/L (ref 4.4–5.9)
SODIUM SERPL-SCNC: 137 MMOL/L (ref 133–144)
WBC # BLD AUTO: 12 10E9/L (ref 4–11)

## 2018-12-10 PROCEDURE — 25000132 ZZH RX MED GY IP 250 OP 250 PS 637: Performed by: STUDENT IN AN ORGANIZED HEALTH CARE EDUCATION/TRAINING PROGRAM

## 2018-12-10 PROCEDURE — 94799 UNLISTED PULMONARY SVC/PX: CPT

## 2018-12-10 PROCEDURE — 40000141 ZZH STATISTIC PERIPHERAL IV START W/O US GUIDANCE

## 2018-12-10 PROCEDURE — 36415 COLL VENOUS BLD VENIPUNCTURE: CPT | Performed by: NURSE PRACTITIONER

## 2018-12-10 PROCEDURE — 40000275 ZZH STATISTIC RCP TIME EA 10 MIN

## 2018-12-10 PROCEDURE — 25000128 H RX IP 250 OP 636: Performed by: STUDENT IN AN ORGANIZED HEALTH CARE EDUCATION/TRAINING PROGRAM

## 2018-12-10 PROCEDURE — 40000193 ZZH STATISTIC PT WARD VISIT

## 2018-12-10 PROCEDURE — 85027 COMPLETE CBC AUTOMATED: CPT | Performed by: NURSE PRACTITIONER

## 2018-12-10 PROCEDURE — 25000128 H RX IP 250 OP 636: Performed by: NURSE PRACTITIONER

## 2018-12-10 PROCEDURE — 25000125 ZZHC RX 250: Performed by: NURSE PRACTITIONER

## 2018-12-10 PROCEDURE — 25000125 ZZHC RX 250: Performed by: ANESTHESIOLOGY

## 2018-12-10 PROCEDURE — 25000132 ZZH RX MED GY IP 250 OP 250 PS 637: Performed by: NURSE PRACTITIONER

## 2018-12-10 PROCEDURE — 97162 PT EVAL MOD COMPLEX 30 MIN: CPT | Mod: GP

## 2018-12-10 PROCEDURE — 27110038 ZZH RX 271: Performed by: ANESTHESIOLOGY

## 2018-12-10 PROCEDURE — 27210300 ZZH CANNULA HIGH FLOW, ADULT

## 2018-12-10 PROCEDURE — 97530 THERAPEUTIC ACTIVITIES: CPT | Mod: GP

## 2018-12-10 PROCEDURE — 71045 X-RAY EXAM CHEST 1 VIEW: CPT

## 2018-12-10 PROCEDURE — 73000 X-RAY EXAM OF COLLAR BONE: CPT | Mod: 50

## 2018-12-10 PROCEDURE — 20000004 ZZH R&B ICU UMMC

## 2018-12-10 PROCEDURE — 80048 BASIC METABOLIC PNL TOTAL CA: CPT | Performed by: NURSE PRACTITIONER

## 2018-12-10 PROCEDURE — 71045 X-RAY EXAM CHEST 1 VIEW: CPT | Mod: 77

## 2018-12-10 PROCEDURE — 25000125 ZZHC RX 250: Performed by: STUDENT IN AN ORGANIZED HEALTH CARE EDUCATION/TRAINING PROGRAM

## 2018-12-10 PROCEDURE — 94150 VITAL CAPACITY TEST: CPT

## 2018-12-10 RX ORDER — POTASSIUM CL/LIDO/0.9 % NACL 10MEQ/0.1L
10 INTRAVENOUS SOLUTION, PIGGYBACK (ML) INTRAVENOUS
Status: DISCONTINUED | OUTPATIENT
Start: 2018-12-10 | End: 2018-12-14 | Stop reason: HOSPADM

## 2018-12-10 RX ORDER — KETAMINE HYDROCHLORIDE 10 MG/ML
10 INJECTION, SOLUTION INTRAMUSCULAR; INTRAVENOUS EVERY 6 HOURS PRN
Status: DISCONTINUED | OUTPATIENT
Start: 2018-12-10 | End: 2018-12-11

## 2018-12-10 RX ORDER — BISACODYL 10 MG
10 SUPPOSITORY, RECTAL RECTAL DAILY PRN
Status: DISCONTINUED | OUTPATIENT
Start: 2018-12-10 | End: 2018-12-14 | Stop reason: HOSPADM

## 2018-12-10 RX ORDER — POTASSIUM CHLORIDE 29.8 MG/ML
20 INJECTION INTRAVENOUS
Status: DISCONTINUED | OUTPATIENT
Start: 2018-12-10 | End: 2018-12-14 | Stop reason: HOSPADM

## 2018-12-10 RX ORDER — POLYETHYLENE GLYCOL 3350 17 G/17G
17 POWDER, FOR SOLUTION ORAL 2 TIMES DAILY
Status: DISCONTINUED | OUTPATIENT
Start: 2018-12-10 | End: 2018-12-14 | Stop reason: HOSPADM

## 2018-12-10 RX ORDER — POTASSIUM CHLORIDE 750 MG/1
20-40 TABLET, EXTENDED RELEASE ORAL
Status: DISCONTINUED | OUTPATIENT
Start: 2018-12-10 | End: 2018-12-14 | Stop reason: HOSPADM

## 2018-12-10 RX ORDER — POTASSIUM CHLORIDE 7.45 MG/ML
10 INJECTION INTRAVENOUS
Status: DISCONTINUED | OUTPATIENT
Start: 2018-12-10 | End: 2018-12-14 | Stop reason: HOSPADM

## 2018-12-10 RX ORDER — MAGNESIUM SULFATE HEPTAHYDRATE 40 MG/ML
4 INJECTION, SOLUTION INTRAVENOUS EVERY 4 HOURS PRN
Status: DISCONTINUED | OUTPATIENT
Start: 2018-12-10 | End: 2018-12-14 | Stop reason: HOSPADM

## 2018-12-10 RX ORDER — BUPIVACAINE HYDROCHLORIDE 5 MG/ML
3 INJECTION, SOLUTION EPIDURAL; INTRACAUDAL ONCE
Status: COMPLETED | OUTPATIENT
Start: 2018-12-10 | End: 2018-12-10

## 2018-12-10 RX ORDER — POTASSIUM CHLORIDE 1.5 G/1.58G
20-40 POWDER, FOR SOLUTION ORAL
Status: DISCONTINUED | OUTPATIENT
Start: 2018-12-10 | End: 2018-12-14 | Stop reason: HOSPADM

## 2018-12-10 RX ADMIN — THIAMINE HYDROCHLORIDE 250 MG: 100 INJECTION, SOLUTION INTRAMUSCULAR; INTRAVENOUS at 08:19

## 2018-12-10 RX ADMIN — FOLIC ACID 1 MG: 1 TABLET ORAL at 08:15

## 2018-12-10 RX ADMIN — Medication 5 MG/HR: at 00:09

## 2018-12-10 RX ADMIN — METHOCARBAMOL TABLETS 1000 MG: 500 TABLET, COATED ORAL at 08:16

## 2018-12-10 RX ADMIN — GABAPENTIN 300 MG: 300 CAPSULE ORAL at 13:51

## 2018-12-10 RX ADMIN — METHOCARBAMOL TABLETS 1000 MG: 500 TABLET, COATED ORAL at 11:48

## 2018-12-10 RX ADMIN — SENNOSIDES AND DOCUSATE SODIUM 2 TABLET: 8.6; 5 TABLET ORAL at 08:16

## 2018-12-10 RX ADMIN — ACETAMINOPHEN 975 MG: 325 TABLET, FILM COATED ORAL at 16:21

## 2018-12-10 RX ADMIN — POLYETHYLENE GLYCOL 3350 17 G: 17 POWDER, FOR SOLUTION ORAL at 08:16

## 2018-12-10 RX ADMIN — POLYETHYLENE GLYCOL 3350 17 G: 17 POWDER, FOR SOLUTION ORAL at 20:53

## 2018-12-10 RX ADMIN — ACETAMINOPHEN 975 MG: 325 TABLET, FILM COATED ORAL at 08:15

## 2018-12-10 RX ADMIN — GABAPENTIN 300 MG: 300 CAPSULE ORAL at 08:15

## 2018-12-10 RX ADMIN — Medication 10 MG: at 18:28

## 2018-12-10 RX ADMIN — THERA TABS 1 TABLET: TAB at 08:16

## 2018-12-10 RX ADMIN — SENNOSIDES AND DOCUSATE SODIUM 2 TABLET: 8.6; 5 TABLET ORAL at 20:53

## 2018-12-10 RX ADMIN — ENOXAPARIN SODIUM 30 MG: 30 INJECTION SUBCUTANEOUS at 16:21

## 2018-12-10 RX ADMIN — METHOCARBAMOL TABLETS 1000 MG: 500 TABLET, COATED ORAL at 16:21

## 2018-12-10 RX ADMIN — ACETAMINOPHEN 975 MG: 325 TABLET, FILM COATED ORAL at 00:08

## 2018-12-10 RX ADMIN — Medication: at 16:21

## 2018-12-10 RX ADMIN — Medication: at 00:11

## 2018-12-10 RX ADMIN — GABAPENTIN 300 MG: 300 CAPSULE ORAL at 20:53

## 2018-12-10 RX ADMIN — SODIUM CHLORIDE, POTASSIUM CHLORIDE, SODIUM LACTATE AND CALCIUM CHLORIDE: 600; 310; 30; 20 INJECTION, SOLUTION INTRAVENOUS at 06:09

## 2018-12-10 RX ADMIN — Medication 5 MG/HR: at 10:59

## 2018-12-10 RX ADMIN — BUPIVACAINE HYDROCHLORIDE 15 MG: 5 INJECTION, SOLUTION EPIDURAL; INTRACAUDAL at 10:43

## 2018-12-10 RX ADMIN — METHOCARBAMOL TABLETS 1000 MG: 500 TABLET, COATED ORAL at 20:53

## 2018-12-10 RX ADMIN — PANTOPRAZOLE SODIUM 40 MG: 40 TABLET, DELAYED RELEASE ORAL at 08:15

## 2018-12-10 ASSESSMENT — ENCOUNTER SYMPTOMS
WEAKNESS: 1
BACK PAIN: 1

## 2018-12-10 ASSESSMENT — PAIN DESCRIPTION - DESCRIPTORS
DESCRIPTORS: SORE
DESCRIPTORS: SORE

## 2018-12-10 ASSESSMENT — ACTIVITIES OF DAILY LIVING (ADL)
ADLS_ACUITY_SCORE: 10
ADLS_ACUITY_SCORE: 12
ADLS_ACUITY_SCORE: 10
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
## 2018-12-10 NOTE — PLAN OF CARE
PT - 4A  Discharge Planner PT   Patient plan for discharge: Home  Current status: Pt c/o significant pain. Min A x 1 for supine to sit. CGA for sit to stand from EOB, mod A x 1 from recliner. Static standing, weight shifts, and marching. Pt requiring min A to maintain balance and pt c/o dizziness. Instructed pt on ambulation from bed to recliner and back with HHA/min A, 10ft each way. VSS throughout session.   Barriers to return to prior living situation: Requires assistance with bed mobility, transfers, and gait. Has not attempted stairs yet, 28 to enter apartment.   Recommendations for discharge: TCU  Rationale for recommendations: Current status. Pt would benefit from skilled PT intervention to improve LE strength  and balance,  and independence with bed mobility, transfers, and gait.   Entered by: Sundeep Soares 12/10/2018 12:19 PM
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
## 2018-12-10 NOTE — PROGRESS NOTES
REGIONAL ANESTHESIA PAIN SERVICE CONTINUOUS NERVE INFUSION NOTE  Quang Carolina is a 53 year old male CD #1 s/p No admission procedures for hospital encounter. and placement of left T5-6 erector spinae (ES) catheter for pain control.     SUBJECTIVE:  Interval History: Overnight events: none.  Patient reports moderate pain control with nerve block continuous infusion and current analgesics (see below).  Denies weakness, paresthesias, circumoral numbness, metallic taste or tinnitus. Patient is ambulating with assistance.  Currently tolerating a diet, denies nausea or vomiting.                 Clinically Aligned Pain Assessment (CAPA):   Comfort (How is your pain?): Tolerable with discomfort  Change in Pain (Since your last medication/intervention?): About the same  Pain Control (How are your pain treatments working?):  Partially effective pain control  Functioning (Are you able to do activities to get better?) : Can do most things, but pain gets in the way of some   Sleep (Does your pain management allow you to sleep or rest?): Normal sleep        Antithrombotic/Thrombolytic Therapy ordered:  none     Analgesic Medications:               Medications related to Pain Management (From now, onward)     Start     Dose/Rate Route Frequency Ordered Stop     12/09/18 1545   ROPivacaine 0.2% (NAROPIN) 750 mL in ON-Q C-Bloc select flow ([id number] holds 600-750 mL) single cath disposable pump      14 mL/hr  Irrigation Elastomeric Pump 12/09/18 1543       12/09/18 1400   gabapentin (NEURONTIN) capsule 300 mg      300 mg Oral 3 TIMES DAILY 12/09/18 1144       12/09/18 1200   methocarbamol (ROBAXIN) tablet 1,000 mg      1,000 mg Oral 4 TIMES DAILY 12/09/18 1040       12/09/18 1040   hydrOXYzine (ATARAX) tablet 25 mg      25 mg Oral EVERY 6 HOURS PRN 12/09/18 1040       12/09/18 1040   hydrOXYzine (ATARAX) tablet 50 mg      50 mg Oral EVERY 6 HOURS PRN 12/09/18 1040       12/09/18 0800   polyethylene glycol (MIRALAX/GLYCOLAX) Packet  17 g      17 g Oral DAILY 12/08/18 2222 12/08/18 2315   ketamine 50 mg/mL ADULT (KETALAR) 2 mg/mL in sodium chloride 0.9 % 30 mL ANALGESIA infusion      5 mg/hr  2.5 mL/hr  Intravenous CONTINUOUS 12/08/18 2307 12/08/18 2300   acetaminophen (TYLENOL) tablet 975 mg      975 mg Oral EVERY 8 HOURS 12/08/18 2229 12/11/18 2359 12/08/18 2230   HYDROmorphone (DILAUDID) PCA 1 mg/mL OPIOID NAIVE        Intravenous CONTINUOUS 12/08/18 2229 12/08/18 2229   senna-docusate (SENOKOT-S/PERICOLACE) 8.6-50 MG per tablet 1 tablet      1 tablet Oral 2 TIMES DAILY 12/08/18 2229 12/08/18 2229   senna-docusate (SENOKOT-S/PERICOLACE) 8.6-50 MG per tablet 2 tablet      2 tablet Oral 2 TIMES DAILY 12/08/18 2229 12/08/18 1920   fentaNYL (PF) (SUBLIMAZE) injection 50 mcg     Comments:  DO NOT USE THIS FIELD FOR ADMIN INSTRUCTIONS; INFORMATION DOES NOT SHOW ON MAR. USE THE FIELD ABOVE MARKED ADMIN INSTRUCTIONS    50 mcg Intravenous EVERY 1 HOUR PRN 12/08/18 1920               OBJECTIVE:  Lab results      Recent Labs   Lab Test 12/10/18  0310   WBC 12.0*   RBC 4.17*   HGB 13.0*   HCT 39.0*   MCV 94   MCH 31.2   MCHC 33.3   RDW 12.7                  Lab Results   Component Value Date     INR 1.25 12/09/2018     INR 0.98 12/08/2018     INR 1.86 10/11/2013            Vitals:    Temp:  [98.2  F (36.8  C)-98.8  F (37.1  C)] 98.8  F (37.1  C)  Heart Rate:  [] 86  Resp:  [14-16] 16  BP: (112-155)/(66-97) 112/69  SpO2:  [91 %-97 %] 91 %     Exam:   GEN: alert and no distress  NEURO/MSK: Strength B/L LE 5/5  and overall symmetric  SKIN: left erector spinae (ES) catheter site with dressing c/d/i, no tenderness, erythema, heme, edema        ASSESSMENT/PLAN:    Patient is receiving adequate analgesia with current multimodal therapy including left T5-6 erector spinae (ES) catheter with infusion of Ropivacaine 0.2%  at 14mL/hr.  Motor function and meeting activity goals.  No evidence of adverse side effects  related to local anesthetic.               incrementaly with negative aspirate before and between each mL without complication, no symptoms of local anesthetic toxicity (LAST).  Remained with and assessed patient for 10 min post-injection - MAP>90 at q 5 and q 10 min post injection.  RN aware to continue monitor     BP/P, MAP Q 5 min x 30 min, and assess for any untoward effects to local anesthetic following injection. Page RAPS #[phone] for any questions/concerns, MAP < 60 or  to request pain assess for q 12 hr bolus .        - 1030 continue Ropivacaine 0.2% infusion rate at 14mL/hr, CD #4 - 0036 pt reports significant decrease in rib fx pain after the bolus     - expected change of next On-Q pump is 1-2 days  - antithrombotic/thrombolytic therapy: none. Please contact RAPS (#[phone]) prior to any medication changes  - will continue to follow and adjust as needed      lEiezer Walden MD  Anesthesiologist    RAPS Contact Info (24 hour job code pager is the last 4 digits) For in-house use only:   [employer] phone: [place] [phone], [place] [phone], [facility] [phone], then enter call-back number.    Text: Use "[employer]" on the Intranet <Paging/Directory> tab and enter Jobcode ID.   If no call back at any time, contact the hospital  and ask for RAPS attending or backup
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
## 2018-12-11 ENCOUNTER — APPOINTMENT (OUTPATIENT)
Dept: PHYSICAL THERAPY | Facility: CLINIC | Age: 53
End: 2018-12-11
Payer: COMMERCIAL

## 2018-12-11 ENCOUNTER — APPOINTMENT (OUTPATIENT)
Dept: CT IMAGING | Facility: CLINIC | Age: 53
End: 2018-12-11
Payer: COMMERCIAL

## 2018-12-11 LAB
ABO + RH BLD: NORMAL
ABO + RH BLD: NORMAL
ANION GAP SERPL CALCULATED.3IONS-SCNC: 8 MMOL/L (ref 3–14)
BLD GP AB SCN SERPL QL: NORMAL
BLOOD BANK CMNT PATIENT-IMP: NORMAL
BUN SERPL-MCNC: 9 MG/DL (ref 7–30)
CALCIUM SERPL-MCNC: 8.3 MG/DL (ref 8.5–10.1)
CHLORIDE SERPL-SCNC: 103 MMOL/L (ref 94–109)
CO2 SERPL-SCNC: 28 MMOL/L (ref 20–32)
CREAT SERPL-MCNC: 0.89 MG/DL (ref 0.66–1.25)
ERYTHROCYTE [DISTWIDTH] IN BLOOD BY AUTOMATED COUNT: 12.8 % (ref 10–15)
GFR SERPL CREATININE-BSD FRML MDRD: 90 ML/MIN/1.7M2
GLUCOSE SERPL-MCNC: 130 MG/DL (ref 70–99)
HCT VFR BLD AUTO: 39.9 % (ref 40–53)
HGB BLD-MCNC: 12.9 G/DL (ref 13.3–17.7)
MCH RBC QN AUTO: 31.1 PG (ref 26.5–33)
MCHC RBC AUTO-ENTMCNC: 32.3 G/DL (ref 31.5–36.5)
MCV RBC AUTO: 96 FL (ref 78–100)
PLATELET # BLD AUTO: 161 10E9/L (ref 150–450)
POTASSIUM SERPL-SCNC: 3.9 MMOL/L (ref 3.4–5.3)
RBC # BLD AUTO: 4.15 10E12/L (ref 4.4–5.9)
SODIUM SERPL-SCNC: 138 MMOL/L (ref 133–144)
SPECIMEN EXP DATE BLD: NORMAL
WBC # BLD AUTO: 9.2 10E9/L (ref 4–11)

## 2018-12-11 PROCEDURE — 86850 RBC ANTIBODY SCREEN: CPT | Performed by: STUDENT IN AN ORGANIZED HEALTH CARE EDUCATION/TRAINING PROGRAM

## 2018-12-11 PROCEDURE — 94762 N-INVAS EAR/PLS OXIMTRY CONT: CPT

## 2018-12-11 PROCEDURE — 25000132 ZZH RX MED GY IP 250 OP 250 PS 637: Performed by: NURSE PRACTITIONER

## 2018-12-11 PROCEDURE — 25000128 H RX IP 250 OP 636: Performed by: NURSE PRACTITIONER

## 2018-12-11 PROCEDURE — 25000125 ZZHC RX 250: Performed by: NURSE PRACTITIONER

## 2018-12-11 PROCEDURE — 97116 GAIT TRAINING THERAPY: CPT | Mod: GP

## 2018-12-11 PROCEDURE — 71250 CT THORAX DX C-: CPT

## 2018-12-11 PROCEDURE — 97530 THERAPEUTIC ACTIVITIES: CPT | Mod: GP

## 2018-12-11 PROCEDURE — 40000141 ZZH STATISTIC PERIPHERAL IV START W/O US GUIDANCE

## 2018-12-11 PROCEDURE — 36415 COLL VENOUS BLD VENIPUNCTURE: CPT | Performed by: STUDENT IN AN ORGANIZED HEALTH CARE EDUCATION/TRAINING PROGRAM

## 2018-12-11 PROCEDURE — 25000125 ZZHC RX 250: Performed by: ANESTHESIOLOGY

## 2018-12-11 PROCEDURE — 86900 BLOOD TYPING SEROLOGIC ABO: CPT | Performed by: STUDENT IN AN ORGANIZED HEALTH CARE EDUCATION/TRAINING PROGRAM

## 2018-12-11 PROCEDURE — 25000132 ZZH RX MED GY IP 250 OP 250 PS 637: Performed by: STUDENT IN AN ORGANIZED HEALTH CARE EDUCATION/TRAINING PROGRAM

## 2018-12-11 PROCEDURE — 86901 BLOOD TYPING SEROLOGIC RH(D): CPT | Performed by: STUDENT IN AN ORGANIZED HEALTH CARE EDUCATION/TRAINING PROGRAM

## 2018-12-11 PROCEDURE — 20000004 ZZH R&B ICU UMMC

## 2018-12-11 PROCEDURE — 27110038 ZZH RX 271: Performed by: ANESTHESIOLOGY

## 2018-12-11 PROCEDURE — 40000193 ZZH STATISTIC PT WARD VISIT

## 2018-12-11 PROCEDURE — 85027 COMPLETE CBC AUTOMATED: CPT | Performed by: SURGERY

## 2018-12-11 PROCEDURE — 40000809 ZZH STATISTIC NO DOCUMENTATION TO SUPPORT CHARGE

## 2018-12-11 PROCEDURE — 80048 BASIC METABOLIC PNL TOTAL CA: CPT | Performed by: SURGERY

## 2018-12-11 RX ORDER — GABAPENTIN 400 MG/1
400 CAPSULE ORAL 3 TIMES DAILY
Status: DISCONTINUED | OUTPATIENT
Start: 2018-12-11 | End: 2018-12-13

## 2018-12-11 RX ORDER — GLIPIZIDE 10 MG/1
1 TABLET ORAL 4 TIMES DAILY PRN
Status: DISCONTINUED | OUTPATIENT
Start: 2018-12-11 | End: 2018-12-14 | Stop reason: HOSPADM

## 2018-12-11 RX ORDER — LANOLIN ALCOHOL/MO/W.PET/CERES
3 CREAM (GRAM) TOPICAL AT BEDTIME
Status: DISCONTINUED | OUTPATIENT
Start: 2018-12-11 | End: 2018-12-14 | Stop reason: HOSPADM

## 2018-12-11 RX ORDER — NALOXONE HYDROCHLORIDE 0.4 MG/ML
.1-.4 INJECTION, SOLUTION INTRAMUSCULAR; INTRAVENOUS; SUBCUTANEOUS
Status: DISCONTINUED | OUTPATIENT
Start: 2018-12-11 | End: 2018-12-11

## 2018-12-11 RX ORDER — BISACODYL 10 MG
10 SUPPOSITORY, RECTAL RECTAL ONCE
Status: COMPLETED | OUTPATIENT
Start: 2018-12-11 | End: 2018-12-11

## 2018-12-11 RX ORDER — HYDROMORPHONE HYDROCHLORIDE 1 MG/ML
.3-.5 INJECTION, SOLUTION INTRAMUSCULAR; INTRAVENOUS; SUBCUTANEOUS
Status: DISCONTINUED | OUTPATIENT
Start: 2018-12-11 | End: 2018-12-14 | Stop reason: HOSPADM

## 2018-12-11 RX ORDER — BUPIVACAINE HYDROCHLORIDE 5 MG/ML
3 INJECTION, SOLUTION EPIDURAL; INTRACAUDAL ONCE
Status: COMPLETED | OUTPATIENT
Start: 2018-12-11 | End: 2018-12-11

## 2018-12-11 RX ORDER — OXYCODONE HYDROCHLORIDE 5 MG/1
5-10 TABLET ORAL EVERY 4 HOURS PRN
Status: DISCONTINUED | OUTPATIENT
Start: 2018-12-11 | End: 2018-12-14 | Stop reason: HOSPADM

## 2018-12-11 RX ADMIN — METHOCARBAMOL TABLETS 1000 MG: 500 TABLET, COATED ORAL at 12:39

## 2018-12-11 RX ADMIN — OXYCODONE HYDROCHLORIDE 5 MG: 5 TABLET ORAL at 15:19

## 2018-12-11 RX ADMIN — METHOCARBAMOL TABLETS 1000 MG: 500 TABLET, COATED ORAL at 20:43

## 2018-12-11 RX ADMIN — POLYETHYLENE GLYCOL 3350 17 G: 17 POWDER, FOR SOLUTION ORAL at 08:33

## 2018-12-11 RX ADMIN — GABAPENTIN 400 MG: 400 CAPSULE ORAL at 14:27

## 2018-12-11 RX ADMIN — Medication: at 14:24

## 2018-12-11 RX ADMIN — THERA TABS 1 TABLET: TAB at 08:33

## 2018-12-11 RX ADMIN — SODIUM CHLORIDE, POTASSIUM CHLORIDE, SODIUM LACTATE AND CALCIUM CHLORIDE: 600; 310; 30; 20 INJECTION, SOLUTION INTRAVENOUS at 01:54

## 2018-12-11 RX ADMIN — ACETAMINOPHEN 975 MG: 325 TABLET, FILM COATED ORAL at 08:34

## 2018-12-11 RX ADMIN — ACETAMINOPHEN 975 MG: 325 TABLET, FILM COATED ORAL at 00:17

## 2018-12-11 RX ADMIN — METHOCARBAMOL TABLETS 1000 MG: 500 TABLET, COATED ORAL at 16:16

## 2018-12-11 RX ADMIN — OXYCODONE HYDROCHLORIDE 5 MG: 5 TABLET ORAL at 16:17

## 2018-12-11 RX ADMIN — OXYCODONE HYDROCHLORIDE 10 MG: 5 TABLET ORAL at 20:44

## 2018-12-11 RX ADMIN — ACETAMINOPHEN 975 MG: 325 TABLET, FILM COATED ORAL at 16:16

## 2018-12-11 RX ADMIN — SENNOSIDES AND DOCUSATE SODIUM 2 TABLET: 8.6; 5 TABLET ORAL at 08:34

## 2018-12-11 RX ADMIN — GABAPENTIN 300 MG: 300 CAPSULE ORAL at 08:34

## 2018-12-11 RX ADMIN — ENOXAPARIN SODIUM 30 MG: 30 INJECTION SUBCUTANEOUS at 20:47

## 2018-12-11 RX ADMIN — METHOCARBAMOL TABLETS 1000 MG: 500 TABLET, COATED ORAL at 08:33

## 2018-12-11 RX ADMIN — Medication 10 MG: at 04:54

## 2018-12-11 RX ADMIN — BUPIVACAINE HYDROCHLORIDE 15 MG: 5 INJECTION, SOLUTION EPIDURAL; INTRACAUDAL at 10:34

## 2018-12-11 RX ADMIN — MELATONIN TAB 3 MG 3 MG: 3 TAB at 22:09

## 2018-12-11 RX ADMIN — ENOXAPARIN SODIUM 30 MG: 30 INJECTION SUBCUTANEOUS at 08:36

## 2018-12-11 RX ADMIN — FOLIC ACID 1 MG: 1 TABLET ORAL at 08:34

## 2018-12-11 RX ADMIN — THIAMINE HYDROCHLORIDE 250 MG: 100 INJECTION, SOLUTION INTRAMUSCULAR; INTRAVENOUS at 11:17

## 2018-12-11 RX ADMIN — BISACODYL 10 MG: 10 SUPPOSITORY RECTAL at 15:16

## 2018-12-11 RX ADMIN — GABAPENTIN 400 MG: 400 CAPSULE ORAL at 20:44

## 2018-12-11 ASSESSMENT — ACTIVITIES OF DAILY LIVING (ADL)
ADLS_ACUITY_SCORE: 12

## 2018-12-11 ASSESSMENT — PAIN DESCRIPTION - DESCRIPTORS
DESCRIPTORS: SORE
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
## 2018-12-11 NOTE — PROGRESS NOTES
REGIONAL ANESTHESIA PAIN SERVICE (RAPS) EVALUATION:  - Time: 1040.  Called to evaluate patient for bolus   - Evaluation: Patient reports pain intensity with current therapy 8/10 at rest and NA with activity  General: healthy, alert and no distress  Catheter system integrity: intact  Skin: dressing clean, dry and intact   .  Current vitals: /79, P 67,     - Assessment/Plan    PAIN: moderately well controlled     INTERVENTION: bolus 7 ml 0.25% bupivacaine   Bolus administered    MEDICATION: PF bupivacaine 0.25%, total bolus 7 mL    PROCEDURE: Clinician bolus via ES catheter; administered without complication with negative aspirate before and between each mL.    No symptoms of local anesthetic systemic toxicity (LAST). Remained with and assessed patient for 10 min post-injection. BP, P and MAP stable    POST-PROCEDURE: Bedside RN aware of need to continue BP, P and MAP monitoring Q 10 min for an additional 20 min. Contact RAPS (jobcode ID) if any of the following: patient experiencing any untoward effects, SBP< 90, P < 50 or > 120, MAP < 60       Drew Cleo, DO  CA-2  Anesthesiology Resident    RAPS Contact Info (24 hour job code pager is the last 4 digits) For in-house use only:  [employer] phone: [place] [phone], [employer] [phone], [facility] [phone], then enter call-back number.    Text: Use [employer] on the Intranet <Paging/Directory> tab and enter Jobcode ID.   If no call back at any time, contact the hospital  and ask for RAPS attending or backup

## 2018-12-11 NOTE — PROGRESS NOTES
D: Afebrile, VSS. Alert and oriented x4. I: Dilaudid PCA discontinued. Started on po oxycodone. CT remains to water seal,Epidural bolus per anesthesia. IV saline locked  A: States adequate pain control on oxycodone. Large BM after bisacodyl suppository. Eats 100% of regular diet. Voids adequately. Up in halls with PT and up in chair x3 today. P; Continue plan of care.
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
## 2018-12-11 NOTE — PLAN OF CARE
PT - 4A  Discharge Planner PT   Patient plan for discharge: Not discussed  Current status: Pt c/o of significant R rib pain with bed mobility. Mod A x 1 for supine to sit. Min A x 1 for sit to stand from EOB. Pt impulsive with ambulating before instructed. Gait training x 150ft with R hand on IV pole and on 10L oxyplus mask. Verbal cues for pt to heel strike, avoid forward trunk lean, and take slower breaths. Pt maintained SaO2 >90% throughout session.   Barriers to return to prior living situation: Requires assistance with functional mobility, have not attempted stairs.   Recommendations for discharge: TCU  Rationale for recommendations: Current status.   Entered by: Sundeep Soares 12/11/2018 11:06 AM

## 2018-12-11 NOTE — PLAN OF CARE
OT-4a: Cx- attempted to see pt this PM for OT, but pt declining having recently received suppository.
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
## 2018-12-12 ENCOUNTER — APPOINTMENT (OUTPATIENT)
Dept: GENERAL RADIOLOGY | Facility: CLINIC | Age: 53
End: 2018-12-12
Attending: SURGERY
Payer: COMMERCIAL

## 2018-12-12 ENCOUNTER — APPOINTMENT (OUTPATIENT)
Dept: GENERAL RADIOLOGY | Facility: CLINIC | Age: 53
End: 2018-12-12
Attending: NURSE PRACTITIONER
Payer: COMMERCIAL

## 2018-12-12 ENCOUNTER — APPOINTMENT (OUTPATIENT)
Dept: OCCUPATIONAL THERAPY | Facility: CLINIC | Age: 53
End: 2018-12-12
Payer: COMMERCIAL

## 2018-12-12 PROCEDURE — 94150 VITAL CAPACITY TEST: CPT

## 2018-12-12 PROCEDURE — 40000275 ZZH STATISTIC RCP TIME EA 10 MIN

## 2018-12-12 PROCEDURE — 25000128 H RX IP 250 OP 636: Performed by: NURSE PRACTITIONER

## 2018-12-12 PROCEDURE — 40000983 ZZH STATISTIC HFNC ADULT NON-CPAP

## 2018-12-12 PROCEDURE — 40000133 ZZH STATISTIC OT WARD VISIT: Performed by: OCCUPATIONAL THERAPIST

## 2018-12-12 PROCEDURE — 12000008 ZZH R&B INTERMEDIATE UMMC

## 2018-12-12 PROCEDURE — 25000132 ZZH RX MED GY IP 250 OP 250 PS 637: Performed by: NURSE PRACTITIONER

## 2018-12-12 PROCEDURE — 97535 SELF CARE MNGMENT TRAINING: CPT | Mod: GO | Performed by: OCCUPATIONAL THERAPIST

## 2018-12-12 PROCEDURE — 71045 X-RAY EXAM CHEST 1 VIEW: CPT

## 2018-12-12 PROCEDURE — 25000125 ZZHC RX 250: Performed by: STUDENT IN AN ORGANIZED HEALTH CARE EDUCATION/TRAINING PROGRAM

## 2018-12-12 PROCEDURE — 71045 X-RAY EXAM CHEST 1 VIEW: CPT | Mod: 77

## 2018-12-12 PROCEDURE — 25000125 ZZHC RX 250: Performed by: ANESTHESIOLOGY

## 2018-12-12 PROCEDURE — 27110038 ZZH RX 271: Performed by: ANESTHESIOLOGY

## 2018-12-12 RX ORDER — BUPIVACAINE HYDROCHLORIDE 2.5 MG/ML
5 INJECTION, SOLUTION EPIDURAL; INFILTRATION; INTRACAUDAL ONCE
Status: COMPLETED | OUTPATIENT
Start: 2018-12-12 | End: 2018-12-12

## 2018-12-12 RX ADMIN — OXYCODONE HYDROCHLORIDE 10 MG: 5 TABLET ORAL at 11:53

## 2018-12-12 RX ADMIN — BUPIVACAINE HYDROCHLORIDE 12.5 MG: 2.5 INJECTION, SOLUTION EPIDURAL; INFILTRATION; INTRACAUDAL at 10:25

## 2018-12-12 RX ADMIN — METHOCARBAMOL TABLETS 1000 MG: 500 TABLET, COATED ORAL at 20:34

## 2018-12-12 RX ADMIN — THIAMINE HYDROCHLORIDE 250 MG: 100 INJECTION, SOLUTION INTRAMUSCULAR; INTRAVENOUS at 08:07

## 2018-12-12 RX ADMIN — THERA TABS 1 TABLET: TAB at 08:01

## 2018-12-12 RX ADMIN — OXYCODONE HYDROCHLORIDE 10 MG: 5 TABLET ORAL at 16:16

## 2018-12-12 RX ADMIN — METHOCARBAMOL TABLETS 1000 MG: 500 TABLET, COATED ORAL at 11:53

## 2018-12-12 RX ADMIN — GABAPENTIN 400 MG: 400 CAPSULE ORAL at 14:15

## 2018-12-12 RX ADMIN — OXYCODONE HYDROCHLORIDE 10 MG: 5 TABLET ORAL at 20:34

## 2018-12-12 RX ADMIN — DEXTRAN 70, GLYCERIN, HYPROMELLOSE 1 DROP: 1; 2; 3 SOLUTION/ DROPS OPHTHALMIC at 02:22

## 2018-12-12 RX ADMIN — FOLIC ACID 1 MG: 1 TABLET ORAL at 08:01

## 2018-12-12 RX ADMIN — METHOCARBAMOL TABLETS 1000 MG: 500 TABLET, COATED ORAL at 08:02

## 2018-12-12 RX ADMIN — ENOXAPARIN SODIUM 30 MG: 30 INJECTION SUBCUTANEOUS at 20:34

## 2018-12-12 RX ADMIN — ENOXAPARIN SODIUM 30 MG: 30 INJECTION SUBCUTANEOUS at 08:01

## 2018-12-12 RX ADMIN — Medication: at 08:08

## 2018-12-12 RX ADMIN — OXYCODONE HYDROCHLORIDE 10 MG: 5 TABLET ORAL at 05:07

## 2018-12-12 RX ADMIN — OXYCODONE HYDROCHLORIDE 10 MG: 5 TABLET ORAL at 00:59

## 2018-12-12 RX ADMIN — GABAPENTIN 400 MG: 400 CAPSULE ORAL at 20:34

## 2018-12-12 RX ADMIN — MELATONIN TAB 3 MG 3 MG: 3 TAB at 22:05

## 2018-12-12 RX ADMIN — GABAPENTIN 400 MG: 400 CAPSULE ORAL at 08:01

## 2018-12-12 RX ADMIN — METHOCARBAMOL TABLETS 1000 MG: 500 TABLET, COATED ORAL at 16:16

## 2018-12-12 ASSESSMENT — ACTIVITIES OF DAILY LIVING (ADL)
ADLS_ACUITY_SCORE: 12

## 2018-12-12 ASSESSMENT — PAIN DESCRIPTION - DESCRIPTORS: DESCRIPTORS: SORE

## 2018-12-12 NOTE — PLAN OF CARE
"OT 4AB: Discharge Planner OT   Patient plan for discharge: Pt agreeable to need for TCU at discharge - pt states \"that would be good. I just can't do anything for myself right now\"  Current status: Pt is alert and conversational, somewhat distractible and mildly impulsive but redirectable, mostly limited by pain.  Mod A supine to EOB and LE dressing, CGA sit to stand and ambulating household distances (approx 250ft with 2 standing rest breaks). Unsteady with ambulation at times.  Able to tolerate standing at sink for g/h with set up.  VSS on 2L  Barriers to return to prior living situation: impaired balance, pain, stairs, assistance needed for self cares and mobility  Recommendations for discharge: TCU  Rationale for recommendations: Pt is currently below baseline with regards to mobility and independence with self cares and will benefit from continued skilled therapy intervention to address deficits.         Entered by: Adriana Taylor 12/12/2018 2:24 PM       "

## 2018-12-12 NOTE — PROGRESS NOTES
REGIONAL ANESTHESIA PAIN SERVICE (RAPS) EVALUATION:  - Time: 2120.  Called to evaluate patient for bolus   - Evaluation: Patient reports pain intensity with current therapy 8/10 at rest and NA with activity  General: healthy, alert and no distress  Catheter system integrity: intact  Skin: dressing clean, dry and intact   .  Current vitals: /81, P 70, MAP 93    - Assessment/Plan    PAIN: moderately well controlled     INTERVENTION: bolus 6 ml 0.25% bupivacaine   Bolus administered    MEDICATION: PF bupivacaine 0.25%, total bolus 6 mL    PROCEDURE: Clinician bolus via ES catheter; administered without complication with negative aspirate before and between each mL.    No symptoms of local anesthetic systemic toxicity (LAST). Remained with and assessed patient for 10 min post-injection. BP, P and MAP stable    POST-PROCEDURE: Bedside RN aware of need to continue BP, P and MAP monitoring Q 10 min for an additional 20 min. Contact RAPS (jobcode ID) if any of the following: patient experiencing any untoward effects, SBP< 90, P < 50 or > 120, MAP < 60       Drew Cleo, DO  CA-2  Anesthesiology Resident    RAPS Contact Info (24 hour job code pager is the last 4 digits) For in-house use only:  [facility] phone: [place] [phone], [place] [phone], [facility] [phone], then enter call-back number.    Text: Use Patient Communicator on the Intranet <Paging/Directory> tab and enter Jobcode ID.   If no call back at any time, contact the hospital  and ask for RAPS attending or backup
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
## 2018-12-12 NOTE — PROGRESS NOTES
Left Chest tube removal note     The patient was placed in Semi-humphreys s position.The chest tube was clamped. The dressing was removed, insertion site was cleansed with chlorhexidine.  Sutures were removed. The patient was instructed to take a deep breath and to hold it. The chest tube was retracted and checked for resistance, then pulled in one gooden motion. An occlusive dressing was applied immediately. The patient's vital signs were stable post procedure. A post pull CXR 4 hours from removal was ordered. A CXR should be obtained STAT if signs of respiratory distress, subcutaneous emphysema or bleeding from the site should occur.     Please page job code [phone] with any concerns.   Johnathon Merlos CNP
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
## 2018-12-12 NOTE — PLAN OF CARE
Pt on 4A     D/I/A:  Neuro: A&OX4, moves all extremities, PRN oxy 10mg Q4. Ropivacaine bolus per anesthesia.  Baseline tremor  Cardiac: NSR to SB, no ectopy, BP stable  Resp: HFNC 40%,  CT to water seal, weak cough   GI: BM X1 overnight  : Good urine output, voids spontaneously.   Access: PIV X2 SL     P: Transfer to the floor, Pulmonary Toilet, continue to monitor, Encourage activity, update MD with changes and concerns.   See flowsheets for additional documentation

## 2018-12-13 ENCOUNTER — APPOINTMENT (OUTPATIENT)
Dept: OCCUPATIONAL THERAPY | Facility: CLINIC | Age: 53
End: 2018-12-13
Payer: COMMERCIAL

## 2018-12-13 LAB
ANION GAP SERPL CALCULATED.3IONS-SCNC: 6 MMOL/L (ref 3–14)
BUN SERPL-MCNC: 16 MG/DL (ref 7–30)
CALCIUM SERPL-MCNC: 9 MG/DL (ref 8.5–10.1)
CHLORIDE SERPL-SCNC: 100 MMOL/L (ref 94–109)
CO2 SERPL-SCNC: 30 MMOL/L (ref 20–32)
CREAT SERPL-MCNC: 0.91 MG/DL (ref 0.66–1.25)
ERYTHROCYTE [DISTWIDTH] IN BLOOD BY AUTOMATED COUNT: 12.9 % (ref 10–15)
GFR SERPL CREATININE-BSD FRML MDRD: 88 ML/MIN/1.7M2
GLUCOSE SERPL-MCNC: 99 MG/DL (ref 70–99)
HCT VFR BLD AUTO: 45.1 % (ref 40–53)
HGB BLD-MCNC: 15 G/DL (ref 13.3–17.7)
MCH RBC QN AUTO: 31.6 PG (ref 26.5–33)
MCHC RBC AUTO-ENTMCNC: 33.3 G/DL (ref 31.5–36.5)
MCV RBC AUTO: 95 FL (ref 78–100)
PLATELET # BLD AUTO: 234 10E9/L (ref 150–450)
POTASSIUM SERPL-SCNC: 4.2 MMOL/L (ref 3.4–5.3)
RBC # BLD AUTO: 4.75 10E12/L (ref 4.4–5.9)
SODIUM SERPL-SCNC: 137 MMOL/L (ref 133–144)
WBC # BLD AUTO: 7.9 10E9/L (ref 4–11)

## 2018-12-13 PROCEDURE — 85027 COMPLETE CBC AUTOMATED: CPT | Performed by: NURSE PRACTITIONER

## 2018-12-13 PROCEDURE — 27110038 ZZH RX 271: Performed by: ANESTHESIOLOGY

## 2018-12-13 PROCEDURE — 25000132 ZZH RX MED GY IP 250 OP 250 PS 637: Performed by: STUDENT IN AN ORGANIZED HEALTH CARE EDUCATION/TRAINING PROGRAM

## 2018-12-13 PROCEDURE — 25000132 ZZH RX MED GY IP 250 OP 250 PS 637: Performed by: NURSE PRACTITIONER

## 2018-12-13 PROCEDURE — 25000128 H RX IP 250 OP 636: Performed by: NURSE PRACTITIONER

## 2018-12-13 PROCEDURE — 25000125 ZZHC RX 250: Performed by: ANESTHESIOLOGY

## 2018-12-13 PROCEDURE — 40000275 ZZH STATISTIC RCP TIME EA 10 MIN: Performed by: OPTOMETRIST

## 2018-12-13 PROCEDURE — 12000008 ZZH R&B INTERMEDIATE UMMC

## 2018-12-13 PROCEDURE — 94150 VITAL CAPACITY TEST: CPT | Performed by: OPTOMETRIST

## 2018-12-13 PROCEDURE — 80048 BASIC METABOLIC PNL TOTAL CA: CPT | Performed by: NURSE PRACTITIONER

## 2018-12-13 PROCEDURE — 99233 SBSQ HOSP IP/OBS HIGH 50: CPT | Performed by: NURSE PRACTITIONER

## 2018-12-13 PROCEDURE — 97530 THERAPEUTIC ACTIVITIES: CPT | Mod: GO | Performed by: OCCUPATIONAL THERAPIST

## 2018-12-13 PROCEDURE — 36415 COLL VENOUS BLD VENIPUNCTURE: CPT | Performed by: NURSE PRACTITIONER

## 2018-12-13 PROCEDURE — 40000133 ZZH STATISTIC OT WARD VISIT: Performed by: OCCUPATIONAL THERAPIST

## 2018-12-13 RX ORDER — IBUPROFEN 400 MG/1
400 TABLET, FILM COATED ORAL EVERY 6 HOURS PRN
Status: DISCONTINUED | OUTPATIENT
Start: 2018-12-13 | End: 2018-12-14

## 2018-12-13 RX ORDER — ACETAMINOPHEN 500 MG
1000 TABLET ORAL EVERY 8 HOURS
Status: DISCONTINUED | OUTPATIENT
Start: 2018-12-13 | End: 2018-12-14 | Stop reason: HOSPADM

## 2018-12-13 RX ORDER — GABAPENTIN 300 MG/1
600 CAPSULE ORAL 3 TIMES DAILY
Status: DISCONTINUED | OUTPATIENT
Start: 2018-12-13 | End: 2018-12-14 | Stop reason: HOSPADM

## 2018-12-13 RX ADMIN — POLYETHYLENE GLYCOL 3350 17 G: 17 POWDER, FOR SOLUTION ORAL at 20:58

## 2018-12-13 RX ADMIN — MENTHOL 1 PATCH: 205.5 PATCH TOPICAL at 11:53

## 2018-12-13 RX ADMIN — ACETAMINOPHEN 1000 MG: 500 TABLET, FILM COATED ORAL at 13:56

## 2018-12-13 RX ADMIN — OXYCODONE HYDROCHLORIDE 10 MG: 5 TABLET ORAL at 09:20

## 2018-12-13 RX ADMIN — METHOCARBAMOL TABLETS 1000 MG: 500 TABLET, COATED ORAL at 20:59

## 2018-12-13 RX ADMIN — THERA TABS 1 TABLET: TAB at 08:16

## 2018-12-13 RX ADMIN — OXYCODONE HYDROCHLORIDE 10 MG: 5 TABLET ORAL at 18:24

## 2018-12-13 RX ADMIN — ENOXAPARIN SODIUM 30 MG: 30 INJECTION SUBCUTANEOUS at 20:58

## 2018-12-13 RX ADMIN — THIAMINE HYDROCHLORIDE 250 MG: 100 INJECTION, SOLUTION INTRAMUSCULAR; INTRAVENOUS at 08:27

## 2018-12-13 RX ADMIN — GABAPENTIN 400 MG: 400 CAPSULE ORAL at 08:16

## 2018-12-13 RX ADMIN — FOLIC ACID 1 MG: 1 TABLET ORAL at 08:16

## 2018-12-13 RX ADMIN — OXYCODONE HYDROCHLORIDE 10 MG: 5 TABLET ORAL at 22:06

## 2018-12-13 RX ADMIN — SENNOSIDES AND DOCUSATE SODIUM 1 TABLET: 8.6; 5 TABLET ORAL at 20:59

## 2018-12-13 RX ADMIN — OXYCODONE HYDROCHLORIDE 10 MG: 5 TABLET ORAL at 14:15

## 2018-12-13 RX ADMIN — METHOCARBAMOL TABLETS 1000 MG: 500 TABLET, COATED ORAL at 08:16

## 2018-12-13 RX ADMIN — METHOCARBAMOL TABLETS 1000 MG: 500 TABLET, COATED ORAL at 12:13

## 2018-12-13 RX ADMIN — OXYCODONE HYDROCHLORIDE 10 MG: 5 TABLET ORAL at 05:16

## 2018-12-13 RX ADMIN — IBUPROFEN 400 MG: 400 TABLET ORAL at 16:16

## 2018-12-13 RX ADMIN — ENOXAPARIN SODIUM 30 MG: 30 INJECTION SUBCUTANEOUS at 08:16

## 2018-12-13 RX ADMIN — METHOCARBAMOL TABLETS 1000 MG: 500 TABLET, COATED ORAL at 16:16

## 2018-12-13 RX ADMIN — GABAPENTIN 600 MG: 300 CAPSULE ORAL at 13:56

## 2018-12-13 RX ADMIN — MELATONIN TAB 3 MG 3 MG: 3 TAB at 22:06

## 2018-12-13 RX ADMIN — GABAPENTIN 600 MG: 300 CAPSULE ORAL at 20:59

## 2018-12-13 RX ADMIN — OXYCODONE HYDROCHLORIDE 10 MG: 5 TABLET ORAL at 00:26

## 2018-12-13 RX ADMIN — ACETAMINOPHEN 1000 MG: 500 TABLET, FILM COATED ORAL at 22:06

## 2018-12-13 ASSESSMENT — ACTIVITIES OF DAILY LIVING (ADL)
ADLS_ACUITY_SCORE: 12

## 2018-12-13 NOTE — PROGRESS NOTES
Provider update:    Left Erector spinae catheter was pulled at 4:06 PM on 12/13/18. Blue catheter tip was intact and no bleeding at site.   For more information please refer to prior note from RAPS on this day.     Aneesh Carmona MD  CA-3/PGY-4

## 2018-12-13 NOTE — PROVIDER NOTIFICATION
Pt c/o numbness and tingling to LUE, mostly to left pinky and ring finger. ON-Q clamped. Anesthesia notified, and called back saying they will come to assess.
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
## 2018-12-13 NOTE — ADDENDUM NOTE
Addendum  created 12/13/18 1505 by Rickie Cabrales MD    Cosign clinical note with attestation, Sign clinical note

## 2018-12-13 NOTE — PLAN OF CARE
Patient AVSS. On Q pain pump titrated down this am. Oxycodone and muscle relaxer given. Hot/Ice packs alternated to left shoulder and side. Bruising present. Moderate pain w/ movement. Abdominal binder ordered. CMS intact. Activity encouraged, patient declined oob this am.    Patient ambulated x 2 this afternoon. On Q dc'd around 4 pm per anesthesia. Site dry and intact.
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
## 2018-12-13 NOTE — PROGRESS NOTES
REGIONAL ANESTHESIA PAIN SERVICE ([facility]) EVALUATION:  - Time: 2145.  Called to evaluate patient for bolus   - Evaluation: Patient reports pain intensity with current therapy 8/10 at rest and NA with activity  General: healthy, alert and no distress  Catheter system integrity: intact  Skin: dressing clean, dry and intact   .  Current vitals: /75, P 90, MAP 92    - Assessment/Plan    PAIN: moderately well controlled     INTERVENTION: bolus 7 ml 0.25% bupivacaine   Bolus administered    MEDICATION: PF bupivacaine 0.25%, total bolus 7 mL    PROCEDURE: Clinician bolus via ES catheter; administered without complication with negative aspirate before and between each mL.    No symptoms of local anesthetic systemic toxicity (LAST). Remained with and assessed patient for 10 min post-injection. BP, P and MAP stable    POST-PROCEDURE: Bedside RN aware of need to continue BP, P and MAP monitoring Q 10 min for an additional 20 min. Contact [facility] (jobcode ID) if any of the following: patient experiencing any untoward effects, SBP< 90, P < 50 or > 120, MAP < 60       Drew Cleo, DO  CA-2  Anesthesiology Resident    [facility] Contact Info (24 hour job code pager is the last 4 digits) For in-house use only:  [employer] phone: [place] [phone], [place] [phone], [facility] [phone], then enter call-back number.    Text: Use [employer] on the Intranet <Paging/Directory> tab and enter Jobcode ID.   If no call back at any time, contact the hospital  and ask for RAPS attending or backup
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
## 2018-12-13 NOTE — PLAN OF CARE
Discharge Planner OT   Patient plan for discharge: TCU  Current status: Pt with L UE NWB and severe pain with mobility. Max A for LE and UE dressing as well as bed mobility. Pt walked household distance with CGA 2/2 pain and dizziness.   Barriers to return to prior living situation: pain, L UE NWB, dizziness  Recommendations for discharge: TCU  Rationale for recommendations: to increase ADL I and tolerance       Entered by: Chuy Cotton 12/13/2018 2:34 PM

## 2018-12-13 NOTE — PLAN OF CARE
Vital signs:  Temp: 98.3  F (36.8  C) Temp src: Oral BP: 119/76   Heart Rate: 79 Resp: 18 SpO2: 95 % O2 Device: Nasal cannula Oxygen Delivery: 2 LPM      Activity: Did not get OOB this shift. NWB to RUE. Pt does not want arm sling on, however, is elevating LUE on pillows.  Neuros: A&O x4.  Cardiac: WDL.  Respiratory: WDL on 2L O2 via NC. O2 sats mid 90s. Denies SOB.   GI/: Voiding via urinal. +BS, passing flatus.  Diet: Regular.   Skin: Abrasions to hands, scabbing, DAY. Subcutaneous emphysema noted around old CT site, Provider aware, CXR preliminary results in chart.  Lines: Left PIV x2 SL. ON-Q @ 14mL/hr to left upper back.  Incisions/Drains: Old CT site with pressure dressing, c/d/i.   Pain/nausea: Pt c/o pain, oxy given x2. Denies nausea.   New changes this shift: Pt c/o numbness and tingling to LUE, mostly to pinky and ring finger, Anesthesia came to assess, however, resolved when Anesthesia arrived. CMS intact now.

## 2018-12-14 ENCOUNTER — APPOINTMENT (OUTPATIENT)
Dept: OCCUPATIONAL THERAPY | Facility: CLINIC | Age: 53
End: 2018-12-14
Payer: COMMERCIAL

## 2018-12-14 VITALS
WEIGHT: 184.53 LBS | DIASTOLIC BLOOD PRESSURE: 65 MMHG | BODY MASS INDEX: 25.74 KG/M2 | SYSTOLIC BLOOD PRESSURE: 105 MMHG | OXYGEN SATURATION: 94 % | RESPIRATION RATE: 18 BRPM | TEMPERATURE: 96.6 F

## 2018-12-14 PROCEDURE — 99239 HOSP IP/OBS DSCHRG MGMT >30: CPT | Performed by: NURSE PRACTITIONER

## 2018-12-14 PROCEDURE — 25000132 ZZH RX MED GY IP 250 OP 250 PS 637: Performed by: NURSE PRACTITIONER

## 2018-12-14 PROCEDURE — 97535 SELF CARE MNGMENT TRAINING: CPT | Mod: GO

## 2018-12-14 PROCEDURE — 40000133 ZZH STATISTIC OT WARD VISIT

## 2018-12-14 PROCEDURE — 25000128 H RX IP 250 OP 636: Performed by: NURSE PRACTITIONER

## 2018-12-14 PROCEDURE — 25000132 ZZH RX MED GY IP 250 OP 250 PS 637: Performed by: STUDENT IN AN ORGANIZED HEALTH CARE EDUCATION/TRAINING PROGRAM

## 2018-12-14 RX ORDER — AMOXICILLIN 250 MG
2 CAPSULE ORAL 2 TIMES DAILY
Qty: 120 TABLET | Refills: 0 | DISCHARGE
Start: 2018-12-14 | End: 2018-12-21

## 2018-12-14 RX ORDER — POLYETHYLENE GLYCOL 3350 17 G/17G
17 POWDER, FOR SOLUTION ORAL 2 TIMES DAILY
Qty: 60 PACKET | Refills: 0 | DISCHARGE
Start: 2018-12-14 | End: 2018-12-21

## 2018-12-14 RX ORDER — FOLIC ACID 1 MG/1
1 TABLET ORAL DAILY
Qty: 30 TABLET | Refills: 0 | DISCHARGE
Start: 2018-12-15 | End: 2019-03-07

## 2018-12-14 RX ORDER — OXYCODONE HYDROCHLORIDE 5 MG/1
5-10 TABLET ORAL EVERY 4 HOURS PRN
Qty: 30 TABLET | Refills: 0 | Status: ON HOLD | OUTPATIENT
Start: 2018-12-14 | End: 2018-12-26

## 2018-12-14 RX ORDER — GABAPENTIN 300 MG/1
600 CAPSULE ORAL 3 TIMES DAILY
Qty: 180 CAPSULE | Refills: 0 | DISCHARGE
Start: 2018-12-14 | End: 2019-03-07

## 2018-12-14 RX ORDER — IBUPROFEN 400 MG/1
400 TABLET, FILM COATED ORAL EVERY 6 HOURS
Qty: 120 TABLET | Refills: 0 | Status: ON HOLD | DISCHARGE
Start: 2018-12-14 | End: 2018-12-30

## 2018-12-14 RX ORDER — OXYCODONE HYDROCHLORIDE 5 MG/1
5-10 TABLET ORAL EVERY 4 HOURS PRN
Status: SHIPPED | DISCHARGE
Start: 2018-12-14 | End: 2018-12-14

## 2018-12-14 RX ORDER — METHOCARBAMOL 500 MG/1
1000 TABLET, FILM COATED ORAL 4 TIMES DAILY
Qty: 56 TABLET | Refills: 0 | DISCHARGE
Start: 2018-12-14 | End: 2019-03-07

## 2018-12-14 RX ORDER — MULTIVITAMIN,THERAPEUTIC
1 TABLET ORAL
Qty: 30 TABLET | Refills: 0 | DISCHARGE
Start: 2018-12-15 | End: 2019-03-07

## 2018-12-14 RX ORDER — IBUPROFEN 400 MG/1
400 TABLET, FILM COATED ORAL EVERY 6 HOURS
Status: DISCONTINUED | OUTPATIENT
Start: 2018-12-14 | End: 2018-12-14 | Stop reason: HOSPADM

## 2018-12-14 RX ORDER — BISACODYL 10 MG
10 SUPPOSITORY, RECTAL RECTAL DAILY PRN
DISCHARGE
Start: 2018-12-14 | End: 2018-12-21

## 2018-12-14 RX ORDER — ACETAMINOPHEN 500 MG
1000 TABLET ORAL EVERY 8 HOURS
Qty: 180 TABLET | Refills: 0 | DISCHARGE
Start: 2018-12-14 | End: 2019-03-07

## 2018-12-14 RX ORDER — LANOLIN ALCOHOL/MO/W.PET/CERES
200 CREAM (GRAM) TOPICAL DAILY
Qty: 60 TABLET | Refills: 0 | DISCHARGE
Start: 2018-12-15 | End: 2019-03-07

## 2018-12-14 RX ADMIN — METHOCARBAMOL TABLETS 1000 MG: 500 TABLET, COATED ORAL at 13:41

## 2018-12-14 RX ADMIN — SENNOSIDES AND DOCUSATE SODIUM 2 TABLET: 8.6; 5 TABLET ORAL at 07:51

## 2018-12-14 RX ADMIN — MENTHOL 1 PATCH: 205.5 PATCH TOPICAL at 07:53

## 2018-12-14 RX ADMIN — ACETAMINOPHEN 1000 MG: 500 TABLET, FILM COATED ORAL at 13:41

## 2018-12-14 RX ADMIN — Medication 200 MG: at 07:51

## 2018-12-14 RX ADMIN — ACETAMINOPHEN 1000 MG: 500 TABLET, FILM COATED ORAL at 06:39

## 2018-12-14 RX ADMIN — OXYCODONE HYDROCHLORIDE 10 MG: 5 TABLET ORAL at 07:05

## 2018-12-14 RX ADMIN — OXYCODONE HYDROCHLORIDE 10 MG: 5 TABLET ORAL at 03:10

## 2018-12-14 RX ADMIN — GABAPENTIN 600 MG: 300 CAPSULE ORAL at 13:41

## 2018-12-14 RX ADMIN — IBUPROFEN 400 MG: 400 TABLET ORAL at 12:32

## 2018-12-14 RX ADMIN — OXYCODONE HYDROCHLORIDE 10 MG: 5 TABLET ORAL at 11:11

## 2018-12-14 RX ADMIN — POLYETHYLENE GLYCOL 3350 17 G: 17 POWDER, FOR SOLUTION ORAL at 07:50

## 2018-12-14 RX ADMIN — METHOCARBAMOL TABLETS 1000 MG: 500 TABLET, COATED ORAL at 07:51

## 2018-12-14 RX ADMIN — THERA TABS 1 TABLET: TAB at 07:51

## 2018-12-14 RX ADMIN — ENOXAPARIN SODIUM 30 MG: 30 INJECTION SUBCUTANEOUS at 07:50

## 2018-12-14 RX ADMIN — OXYCODONE HYDROCHLORIDE 10 MG: 5 TABLET ORAL at 14:39

## 2018-12-14 RX ADMIN — GABAPENTIN 600 MG: 300 CAPSULE ORAL at 10:37

## 2018-12-14 RX ADMIN — FOLIC ACID 1 MG: 1 TABLET ORAL at 07:51

## 2018-12-14 RX ADMIN — IBUPROFEN 400 MG: 400 TABLET ORAL at 03:10

## 2018-12-14 ASSESSMENT — ACTIVITIES OF DAILY LIVING (ADL)
ADLS_ACUITY_SCORE: 12

## 2018-12-14 ASSESSMENT — PAIN DESCRIPTION - DESCRIPTORS: DESCRIPTORS: CONSTANT
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
## 2018-12-14 NOTE — PLAN OF CARE
Patient AVSS. Ready for discharge to TCU [facility] in [place] today at 2:30. Report given to facility @ 12:15 pm. Left sling intact. Pain meds given as ordered. Voiding and had bm this am. CMS intact. Will review orders with patient when completed.
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
## 2018-12-14 NOTE — PLAN OF CARE
AVSS; pain well controlled with prn Oxycodone and Ibuprofen and scheduled  Tylenol; L arm on sling and laying on Aqua K pad and ice  pack on L shoulder intermittently applied;  old L  hcest tube site dressing CDI;  voids spont; no BM overnight; pt slept most of the night, continue to monitor and with POC.

## 2018-12-14 NOTE — PLAN OF CARE
Physical Therapy Discharge Summary    Reason for therapy discharge:    Discharged to transitional care facility.    Progress towards therapy goal(s). See goals on Care Plan in [facility] electronic health record for goal details.  Goals partially met.  Barriers to achieving goals:   discharge from facility.    Therapy recommendation(s):    Continued therapy is recommended.  Rationale/Recommendations:  To progress strength, endurance, balance, and safety and independence with functional mobility.

## 2018-12-14 NOTE — PLAN OF CARE
OT 7B  Discharge Planner OT   Patient plan for discharge: TCU  Current status: SBA/CGA sit<>stand x3 reps throughout session. Pt ambulated to and from the bathroom with close CGA. SBA for a toilet transfer, toileting and heraclio cares with use of one grab bar. CGA for g/h while standing.   Barriers to return to prior living situation: pain, weakness, NWB LUE, acute medical needs  Recommendations for discharge: TCU  Rationale for recommendations: Pt is below baseline and would benefit from continued skilled therapy to increase activity tolerance and independence with ADLs       Entered by: Eugenia Villarreal 12/14/2018 11:38 AM

## 2018-12-14 NOTE — PLAN OF CARE
Occupational Therapy Discharge Summary    Reason for therapy discharge:    Discharged to transitional care facility.    Progress towards therapy goal(s). See goals on Care Plan in [facility] electronic health record for goal details.  Goals partially met.  Barriers to achieving goals:   discharge from facility.    Therapy recommendation(s):    Continued therapy is recommended.  Rationale/Recommendations:  Recommend continued skilled therapy to increase activity tolerance and independence with ADLs.

## 2018-12-14 NOTE — PLAN OF CARE
/63 (BP Location: Right arm)   Temp 97.7  F (36.5  C) (Oral)   Resp 18   Wt 83.7 kg (184 lb 8.4 oz)   SpO2 93%   BMI 25.74 kg/m       VSS on 2L NC. Alert and oriented. Up SBA. Pain controlled with scheduled robaxin and 10mg oxy Q4H. Pt rotating between ice packs and hot packs on L shoulder. Denies nausea. CIWA - 0. PIV saline locked. Voiding, no BM this shift. Resting between cares. Will continue to monitor and notify of any changes.
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
## 2018-12-14 NOTE — PROGRESS NOTES
Social Work Services Discharge Note      Patient Name:  Quang Carolina     Anticipated Discharge Date:  12/14/18    Discharge Disposition:   TCU:  [facility]   [address] [place] [address]  P. [phone], F. [phone]    Following MD:  To be determined at facility     Pre-Admission Screening (PAS) online form has been completed.  The Level of Care (LOC) is:  Determined  Confirmation Code is:  [id number]  Patient/caregiver informed of referral to [facility] for Pre-Admission Screening for skilled nursing facility (SNF) placement and to expect a phone call post discharge from SNF.     Additional Services/Equipment Arranged:  W/c transport arranged via [facility] ([phone]) for today at 2:30pm. Pt was informed of potential out of pocket expense for transport.      Patient / Family response to discharge plan:  Pt is agreeable and reported he will inform his mom.     Persons notified of above discharge plan:  Pt, bedside nurse, charge nurse, NST, Trauma team, receiving facility    Staff Discharge Instructions:  Please fax discharge orders and signed hard scripts for any controlled substances.  Please print a packet and send with patient.     CTS Handoff completed:  No PCP listed    Medicare Notice of Rights provided to the patient/family:  MARY ANNE Zambrano, MSW  7B   [phone] (pager) [phone]  12/14/2018
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
## 2018-12-19 ENCOUNTER — TELEPHONE (OUTPATIENT)
Dept: ORTHOPEDICS | Facility: CLINIC | Age: 53
End: 2018-12-19

## 2018-12-19 ENCOUNTER — PRE VISIT (OUTPATIENT)
Dept: ORTHOPEDICS | Facility: CLINIC | Age: 53
End: 2018-12-19

## 2018-12-19 DIAGNOSIS — S42.002D CLOSED DISPLACED FRACTURE OF LEFT CLAVICLE WITH ROUTINE HEALING, UNSPECIFIED PART OF CLAVICLE, SUBSEQUENT ENCOUNTER: Primary | ICD-10-CM

## 2018-12-19 NOTE — TELEPHONE ENCOUNTER
[facility] Call Center    Phone Message    May a detailed message be left on voicemail: no    Reason for Call: Other: Patient is At [facility] and would like to get surgery scheduled with Dr. Leach as soon as possible. Please call the patient back to discuss. [phone] his bedside phone and you can also call his cell phone as well. Thank you.     Action Taken: Message routed to:  [facility] ([facility]): Ortho

## 2018-12-19 NOTE — TELEPHONE ENCOUNTER
Appointment scheduled with Dr Leach  12/20/18 at [place].  Patient confirmed he has transportation for the appointment.

## 2018-12-20 ENCOUNTER — OFFICE VISIT (OUTPATIENT)
Dept: ORTHOPEDICS | Facility: CLINIC | Age: 53
End: 2018-12-20
Payer: COMMERCIAL

## 2018-12-20 ENCOUNTER — TELEPHONE (OUTPATIENT)
Dept: ORTHOPEDICS | Facility: CLINIC | Age: 53
End: 2018-12-20

## 2018-12-20 ENCOUNTER — ANCILLARY PROCEDURE (OUTPATIENT)
Dept: GENERAL RADIOLOGY | Facility: CLINIC | Age: 53
End: 2018-12-20
Attending: ORTHOPAEDIC SURGERY
Payer: COMMERCIAL

## 2018-12-20 VITALS
OXYGEN SATURATION: 91 % | SYSTOLIC BLOOD PRESSURE: 121 MMHG | WEIGHT: 183 LBS | HEART RATE: 73 BPM | DIASTOLIC BLOOD PRESSURE: 87 MMHG | BODY MASS INDEX: 25.52 KG/M2

## 2018-12-20 DIAGNOSIS — S42.002D CLOSED DISPLACED FRACTURE OF LEFT CLAVICLE WITH ROUTINE HEALING, UNSPECIFIED PART OF CLAVICLE, SUBSEQUENT ENCOUNTER: Primary | ICD-10-CM

## 2018-12-20 DIAGNOSIS — S42.002D CLOSED DISPLACED FRACTURE OF LEFT CLAVICLE WITH ROUTINE HEALING, UNSPECIFIED PART OF CLAVICLE, SUBSEQUENT ENCOUNTER: ICD-10-CM

## 2018-12-20 PROCEDURE — 99204 OFFICE O/P NEW MOD 45 MIN: CPT | Mod: GC | Performed by: ORTHOPAEDIC SURGERY

## 2018-12-20 PROCEDURE — 73000 X-RAY EXAM OF COLLAR BONE: CPT | Mod: LT | Performed by: RADIOLOGY

## 2018-12-20 SDOH — HEALTH STABILITY: MENTAL HEALTH: HOW OFTEN DO YOU HAVE A DRINK CONTAINING ALCOHOL?: NEVER

## 2018-12-20 ASSESSMENT — PAIN SCALES - GENERAL: PAINLEVEL: EXTREME PAIN (9)
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
## 2018-12-20 NOTE — NURSING NOTE
Quang Carolina's chief complaint for this visit includes:  Chief Complaint   Patient presents with     Left Shoulder - Injury     Left midclavicular fracture, XR done 12/8/18, DOI: 12/8/18     PCP: [place], [facility]    Referring Provider:  No referring provider defined for this encounter.    /87 (BP Location: Right arm, Patient Position: Chair, Cuff Size: Adult Regular)   Pulse 73   Wt 83 kg (183 lb)   SpO2 91%   BMI 25.52 kg/m    Extreme Pain (9)     Do you need any medication refills at today's visit? No    Pt is right handed  Occupation :  - [place]

## 2018-12-20 NOTE — TELEPHONE ENCOUNTER
Of note, Pt is currently staying at a TCU ([facility]?) and will likely be discharged back there after surgery.    Procedure: ORIF left clavicle fracture  Facility: [facility]  Length: 120 minute(s)  Surgeon: Hany RAMOS  Anesthesia: General  BMI: There is no height or weight on file to calculate BMI. (If over 43 for general or 45 for MAC cannot be scheduled at  [facility])   Pre-op Appointments needed: H&P within 30 days of surgery, PAC to perform, appt 12/21/18  Post-op appointments needed: 2 weeks provider only, 6 weeks provider only   needed:  No  Surgery packet/instructions given to patient?  Yes     Dov Adam, RN
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
## 2018-12-20 NOTE — PATIENT INSTRUCTIONS
[facility]  [address] [place] [address]  Phone [phone]  Fax [phone]    SURGICAL INFORMATION & INSTRUCTIONS  Dr. Daya Leach  Name of Surgery: Open reduction internal fixation(ORIF) left clavicle fracture    Date of Surgery: 12/26/18    If you need to reschedule/schedule your surgery, please contact Vanessa, our surgery scheduler at [place], at [phone].    Arrival Time: 12:45 pm    Time of Surgery:  2:45 pm    Please arrive early so that we can prepare you for surgery. If you arrive later than your scheduled arrival time, your surgery may be cancelled.  Please note that scheduled times may change, but you will always be notified if there is a change.       Location of Surgery:     ? [facility], [id number] [place] [address], 3rd floor for check-in  Phone [phone]  Fax [phone]  Bring parking ticket with you for validation and reduced parking rate  [email or web address]    Prior to surgery    ? Call your insurance company  Ask if you need pre-approval for your surgery.  If pre-approval is needed, please call our surgery scheduler for assistance with the pre-approval process.   If you do not have insurance, please let us know.     Schedule an appointment with the Preoperative Assessment Clinic (PAC) at the [facility]. This should be done within 30 days of surgery  PAC will review your health history and make sure that you are safe to receive anesthesia. They will also perform a pre-opertive physical as part of this assessment.   Please call [phone] to schedule this. You should have also gotten a separate piece of paper with their information on it.     Tell the provider if you have any of the following:   IF you have a pacemaker or an ICD (implanted cardiac defibrillator). If you do, please bring the ID card  with you on the day of surgery  IF you're a smoker. People who smoke have a higher risk of infection after surgery. Ask your provider how you can quit smoking.  If you have diabetes, work with your provider to control your blood sugar. If its not well-controlled, we may need to delay surgery (or you may have problems healing afterward).  If your surgeon asks you to see your dentist: You'll need to complete any dental work before surgery. Your dentist must send a letter to your surgery center saying it's ok to do the surgery.    ? Pre-Op Phone Call  You will receive a pre-op phone call 1-3 days before surgery to review your eating and drinking restrictions, review medications, and confirm surgery times.      ? 7-10 days BEFORE surgery  ? Stop taking aspirin, Plavix or aspirin products 10 days before surgery or as directed by your doctor.  ? Stop taking non-steroidal anti-inflammatory medications (naproxen/Aleve, ibuprofen/Advil/Motrin, celecoxib/Celebrex, meloxicam/Mobic) 1 week before surgery or as directed by your surgeon.  This will reduce the risk of bleeding during surgery.  ? Stop taking fish oil (Omega-3-fatty acid) 1 week before surgery.  ? It is OK to take acetaminophen (Tylenol) up until 2 hours prior to surgery.  ? Take prescription medications as directed by your doctor.  Discuss which medications to take or hold prior to your surgery, with your primary care doctor.   ? If you have diabetes, ask your primary care doctor or endocrinologist how you should take your medication(s).    ? 24 hours BEFORE surgery  Stop drinking alcohol (beer, wine, liquor) at least 24-hours before and after your surgery.     ? Evening BEFORE surgery  - You may eat a normal meal the night before surgery, but eat nothing after midnight.     - Take a shower - to help wash away bacteria (germs) from your skin.  It s normal to have bacteria on your skin and skin protects us from these germs.  When you have surgery, we cut the skin.   Sometimes germs get into the cuts and cause infection (illness caused by germs).  By following the showering instructions and using the special soap, you will lower the number of germs on your skin.  This decreases your chance of an infection.    - Buy or get 8 ounces of antiseptic surgical soap called 4% CHG.  Common brands of this soap are Hibiclens and Exidine.    - You can find it this soap at your local pharmacy, clinic or retail store.  If you have trouble finding it, ask your pharmacist to help you find the right substitute.    - Do not shave within 12 inches of your incision (surgical cut) area for at least 3 days before surgery.  Shaving can make small cuts in the skin. This puts you at a higher risk of infection.    Items you will need for each shower:   - Newly washed towel  - 4 ounces of one of the recommended soaps    Follow these instructions, the evening before surgery  - Wash your hair and body with your regular shampoo and soap. Make sure you rinse the shampoo and soap from your hair and body.  - Using clean hands, apply about 2 ounces of soap gently on your skin from the neck to your toes. Use on your groin area last. Do not use this soap on your face or head. If you get any soap in your eyes, ears or mouth, rinse right away.  - Once the soap has been on your skin for at least 1 minute, rinse off completely and repeat washing with the surgical soap one more time.  - Rinse well and dry off using a clean towel.  If you feel any tingling, itching or other irritation, rinse right away. It is normal to feel some coolness on the skin after using the antiseptic soap. Your skin may feel a bit dry after the shower, but do not use any lotions, creams or moisturizers. Do not use hair spray or other products in your hair.  - Dress in freshly washed clothes or pajamas. Use fresh pillowcases and sheets on your bed.    ? Day of Surgery  - You may drink clear liquids up to 2 hours before surgery or as directed by  your surgeon.  Clear liquids include: Water, Pedialyte, Gatorade, apple juice and liquids you can read through. Please avoid liquids that are red or orange in color.   - Do NOT drink: milk, coffee, liquids that have pulp, orange juice, and lemonade or tomato juice.   - Do NOT chew gum, chew tobacco or suck on hard candy.    - Take another shower          Follow these instructions:      - Wash your hair and body with your regular shampoo and soap. Make sure you rinse the shampoo and soap from your hair and body.  - Using clean hands, apply about 2 ounces of soap gently on your skin from the neck to your toes. Use on your groin area last. Do not use this soap on your face or head. If you get any soap in your eyes, ears or mouth, rinse right away.  - Once the soap has been on your skin for at least 1 minute, rinse off completely and repeat washing with the surgical soap one more time.  - Rinse well and dry off using a clean towel.  If you feel any tingling, itching or other irritation, rinse right away. It is normal to feel some coolness on the skin after using the antiseptic soap. Your skin may feel a bit dry after the shower, but do not use any lotions, creams or moisturizers. Do not use hair spray or other products in your hair.  - Dress in freshly washed clothes.  - Do not wear deodorant, cologne, lotion, makeup, nail polish or jewelry to surgery.    ? If there is any change in your health PRIOR to your surgery, please contact your surgeon's office.  Such as a fever, body aches, fatigue, any flu-like symptoms, rash, or any new injury to related body part.    ? Arrange for someone to drive you home after surgery.    will need to be a responsible adult (18 years or older) that will provide transportation to and from surgery and stay in the waiting room during your surgery. You may not drive yourself or take public transportation to and from surgery.    ? Arrange for someone to stay with you for 24 hours after  you go home.   This person must be a responsible adult (18 years or older).    ? Bring these items to the hospital/surgery center:   ? Insurance card(s)  ? Money for parking and co-pays, if needed  ? A list of all the medications you take, including vitamins, minerals, herbs and over the counter medications.    ? A copy of your Advance Health Care Directive, if you have one.  This tells us what treatment(s) you would or would not want, and who would make health care decisions, if you could no longer speak for yourself.    ? A case for glasses, contact lenses, hearing aids or dentures.   ? Your inhaler or CPAP machine, if you use these at home    ? Leave extra cash, jewelry and other valuables at home.       ? Other information:   Sleep Apnea: Let your nurse know if you have a history of sleep apnea, only if you are having surgery at the [facility].    When you arrive for surgery  When you get to the surgery center/hospital, you will:  - Check in. If you are under age 18, you must be with a parent or legal guardian.  - Sign consent forms, if you haven t already. These forms state that you know the risks and benefits of surgery. When you sign the forms, you give us permission to do the surgery. Do not sign them unless you understand what will happen during and after your surgery. If you have any questions about your surgery, ask to speak with your doctor before you sign the forms. If you don t understand the answers, ask again.  - Receive a copy of the Patient s Bill of Rights. If you do not receive a copy, please ask for one.  - Change into hospital clothes. Your belongings will be placed in a bag. We will return them to you after surgery.  - Meet with the anesthesia provider. He or she will tell you what kind of anesthesia (medicine) will be used to keep you comfortable during surgery.  - Remember: it s okay to remind doctors and nurses to wash their hands before touching you.  - In most cases, your  surgeon will use a marker to write his or her initials on the surgery site. This ensures that the exact site is operated on.  - For safety reasons, we will ask you the same questions many times. For example, we may ask your name and birth date over and over again.  - Friends and family can stay with you until it s time for surgery. While you re in surgery, they will be in the waiting area. Please note that cell phones are not allowed in some patient care areas.  - If you have questions about what will happen in the operating room, talk to your care team.  - You will meet with an anesthesiologist, before your surgery.  He or she may reference types of anesthesia commonly used for surgeries:   o General:  This involves the use of an IV for injection of medication and anesthesia. You are put into a sleep and have a breathing tube to assist you with breathing.  o Sedation:  You are asleep, but not so deply that you need a breathing tube.   o Local or Regional: a nerve is injected to numb the surgical area.  o Spinal: you are numbed from the waist down with medicine injected into your back.  o Femoral Nerve Block:  Anesthesia injected into the groin of leg which you are having surgery on.      After surgery  We will move you to a recovery room, where we will watch you closely. If you have any pain or discomfort, tell your nurse. He or she will try to make you comfortable.    - If you are staying overnight, we will move you to your hospital room after you are awake.  - If you are going home, we will move you to another room. Friends and family may be able to join you. The length of time you spend in recovery depend on the type of medicine you received, your medical condition, the type of surgery you had, or your response to the anesthesia given during your procedure.  - When you are discharged from the recovery room, the nurses will review instructions with you and your caregiver.  - Please wash your hands every time you  touch the wound or change bandages or dressings.  - Do not submerge the wound in water.  You may not use a bathtub or hot tub until the wound is closed. The wait time frame is generally 2-3 weeks, but any open area can be a source of incoming bacteria, so it is better to be on the safe side and avoid water submersion until your wound is fully healed.  Keep all dressings clean and dry.   - If you had surgery on your arm or leg, elevate it as much as possible to help reduce swelling.  - You may put ice on the surgical area for comfort, keeping ice on area for up to 20 minutes then off for 40 minutes.  You may do this the first few days after surgery to help reduce pain and swelling.   - Many surgical wounds will have small white strips of tape on them called steri-strips. These are to help support the incision and surrounding skin. Do not remove these. The edges will curl and fall off on their own, typically within 7-10 days with normal showering and hygiene.   - Drink at least 8-10 glasses of liquid each day to help your body heal.  - Keep your lungs clear by coughing and taking deep breaths every couple hours.  This is especially important the first 48 hours after surgery.    - Notify your doctor if you have any of the following:   o Fever of 101 F or higher  o Numbness and/or tingling  o Increased pain, redness or swelling  o Drainage from wound  o Prolonged or uncontrolled bleeding  o Difficulty with movement    ? Physical Therapy  Think about where you would like to have physical therapy (PT) after your surgery. You will be instructed by your surgical team on when to start PT after surgery. If you have questions regarding this, please contact the orthopedic clinic.    Follow-up Appointments, in Clinic  If you don't already have an appointment scheduled, please call to set up an appointment at [phone].      ? Post-Op appointments with provider. (2 and 6 weeks post op)    Dealing with pain  A nurse will  check your comfort level often during your stay. He or she will work with you to manage your pain.  It s expected that you will have pain after surgery.  Our goal is to reduce or minimize pain by:   - Remember pain is real. There are many ways to control pain. We can help you decide what works best for you.  - Ask for pain medicine when you need it.  Don t try to  tough it out --this can make you feel worse. Always take your medicine as ordered.  - Medicine doesn t work the same for everyone. If your medicine isn t working, tell your nurse. There may be other medicines or treatments we can try.  - Medication Refills.  If you need refills on your pain medication, please call the clinic as soon as possible.  It may take 72-business hours to obtain a refill.  Refills must be picked up at check-in 2, [facility] or mailed to a pharmacy of your choice.    - It is expected that you will wean off the pain medications in a timely manner.   - Constipation is a common side effect of pain medication, decreased activity and anesthesia from surgery.  Take a stool softener as prescribed by your doctor at the time of discharge.  You may also use over the counter medications as needed.  Be sure to increase your fiber (fruits and vegetables) and your water intake.      Please call the clinic at [phone], if you experience any problems or have questions.  If you are having an emergency, always call [phone] or seek immediate evaluation at the Emergency Room.    Thank you for selecting the [facility] for your care!  ---------------------------------------------

## 2018-12-21 ENCOUNTER — ANESTHESIA EVENT (OUTPATIENT)
Dept: SURGERY | Facility: CLINIC | Age: 53
End: 2018-12-21
Payer: COMMERCIAL

## 2018-12-21 ENCOUNTER — ANCILLARY PROCEDURE (OUTPATIENT)
Dept: GENERAL RADIOLOGY | Facility: CLINIC | Age: 53
End: 2018-12-21
Attending: NURSE PRACTITIONER
Payer: COMMERCIAL

## 2018-12-21 ENCOUNTER — OFFICE VISIT (OUTPATIENT)
Dept: SURGERY | Facility: CLINIC | Age: 53
End: 2018-12-21
Payer: COMMERCIAL

## 2018-12-21 VITALS
TEMPERATURE: 98.1 F | OXYGEN SATURATION: 95 % | BODY MASS INDEX: 25.43 KG/M2 | SYSTOLIC BLOOD PRESSURE: 122 MMHG | DIASTOLIC BLOOD PRESSURE: 76 MMHG | WEIGHT: 182.3 LBS | HEART RATE: 66 BPM

## 2018-12-21 DIAGNOSIS — Z01.818 PREOP EXAMINATION: ICD-10-CM

## 2018-12-21 DIAGNOSIS — Z01.818 PREOP EXAMINATION: Primary | ICD-10-CM

## 2018-12-21 DIAGNOSIS — S42.002K CLOSED DISPLACED FRACTURE OF LEFT CLAVICLE WITH NONUNION, UNSPECIFIED PART OF CLAVICLE, SUBSEQUENT ENCOUNTER: ICD-10-CM

## 2018-12-21 ASSESSMENT — LIFESTYLE VARIABLES: TOBACCO_USE: 1
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
## 2018-12-21 NOTE — TELEPHONE ENCOUNTER
Date Scheduled: 12-26-18  Facility: [facility]  Surgeon: Dr. Leach   Post-op appointment scheduled:    scheduled?: No  Surgery packet/instructions confirmed received?  Yes  Special Considerations:   Vanessa Ash, Surgery Scheduling Coordinator
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
## 2018-12-21 NOTE — PATIENT INSTRUCTIONS
Preparing for Your Surgery      Name:  Quang Carolina   MRN:  [id number]   :  1965   Today's Date:  2018     Arriving for surgery: Left Clavicle ORIF  Surgery date:  2018  Arrival time:  12:15 pm  Please come to:     [facility] [address]  [address] [place]  [address]    - parking is available in front of [facility] from 5:15AM to 8:00PM. If you prefer, park your car in the Green Lot.    -Proceed to the 3rd floor, check in at the Adult Surgery Waiting Lounge. [phone]    If an escort is needed stop at the Information Desk in the lobby. Inform the information person that you are here for surgery. An escort to the Adult Surgery Waiting Lounge will be provided.        What can I eat or drink?  -  You may have solid food or milk products until 8 hours prior to your surgery. (6 am )  -  You may have water, apple juice or 7up/Sprite until 2 hours prior to your surgery.(until 12:15 pm arrival time)    Which medicines can I take?        Stop Aspirin, vitamins and supplements one week prior to surgery.      Hold Ibuprofen and Naproxen for 24 hours prior to surgery.     -  Do NOT take these medications in the morning, the day of surgery:      Methocarbamol (Robaxin)      -  Please take these medications the day of surgery:     Gabapentin      Oxycodone if needed       How do I prepare myself?  -  Take two showers: one the night before surgery; and one the morning of surgery.         Use Scrubcare or Hibiclens to wash from neck down.  You may use your own shampoo and conditioner. No other hair products.   -  Do NOT use lotion, powder, deodorant, or antiperspirant the day of your surgery.  -  Do NOT wear any makeup, fingernail polish or jewelry.  - Do not bring your own medications to the hospital, except for inhalers and eye drops.  -  Bring your ID and insurance card.    Questions or Concerns:  -If you have questions or concerns regarding  the day of surgery, please call [phone].       -For questions after surgery please call your surgeons office.

## 2018-12-24 ENCOUNTER — PATIENT OUTREACH (OUTPATIENT)
Dept: SURGERY | Facility: CLINIC | Age: 53
End: 2018-12-24
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
## 2018-12-26 ENCOUNTER — APPOINTMENT (OUTPATIENT)
Dept: GENERAL RADIOLOGY | Facility: CLINIC | Age: 53
End: 2018-12-26
Attending: ORTHOPAEDIC SURGERY
Payer: COMMERCIAL

## 2018-12-26 ENCOUNTER — ANESTHESIA (OUTPATIENT)
Dept: SURGERY | Facility: CLINIC | Age: 53
End: 2018-12-26
Payer: COMMERCIAL

## 2018-12-26 ENCOUNTER — APPOINTMENT (OUTPATIENT)
Dept: CT IMAGING | Facility: CLINIC | Age: 53
End: 2018-12-26
Attending: STUDENT IN AN ORGANIZED HEALTH CARE EDUCATION/TRAINING PROGRAM
Payer: COMMERCIAL

## 2018-12-26 ENCOUNTER — HOSPITAL ENCOUNTER (INPATIENT)
Facility: CLINIC | Age: 53
LOS: 4 days | Discharge: NURSING, REHAB OR LONG-TERM CARE | End: 2018-12-30
Attending: ORTHOPAEDIC SURGERY | Admitting: INTERNAL MEDICINE
Payer: COMMERCIAL

## 2018-12-26 ENCOUNTER — APPOINTMENT (OUTPATIENT)
Dept: GENERAL RADIOLOGY | Facility: CLINIC | Age: 53
End: 2018-12-26
Attending: ANESTHESIOLOGY
Payer: COMMERCIAL

## 2018-12-26 DIAGNOSIS — S42.022A CLOSED DISPLACED FRACTURE OF SHAFT OF LEFT CLAVICLE, INITIAL ENCOUNTER: Primary | ICD-10-CM

## 2018-12-26 DIAGNOSIS — S22.42XA CLOSED FRACTURE OF MULTIPLE RIBS OF LEFT SIDE, INITIAL ENCOUNTER: ICD-10-CM

## 2018-12-26 PROBLEM — R09.02 HYPOXIA: Status: ACTIVE | Noted: 2018-12-26

## 2018-12-26 PROBLEM — S42.023A CLAVICLE FRACTURE, SHAFT: Status: ACTIVE | Noted: 2018-12-26

## 2018-12-26 LAB
ABO + RH BLD: NORMAL
ABO + RH BLD: NORMAL
BLD GP AB SCN SERPL QL: NORMAL
BLOOD BANK CMNT PATIENT-IMP: NORMAL
GLUCOSE SERPL-MCNC: 93 MG/DL (ref 70–99)
SPECIMEN EXP DATE BLD: NORMAL

## 2018-12-26 PROCEDURE — 36415 COLL VENOUS BLD VENIPUNCTURE: CPT | Performed by: ANESTHESIOLOGY

## 2018-12-26 PROCEDURE — 36000064 ZZH SURGERY LEVEL 4 EA 15 ADDTL MIN - UMMC: Performed by: ORTHOPAEDIC SURGERY

## 2018-12-26 PROCEDURE — 71270 CT THORAX DX C-/C+: CPT

## 2018-12-26 PROCEDURE — 40000171 ZZH STATISTIC PRE-PROCEDURE ASSESSMENT III: Performed by: ORTHOPAEDIC SURGERY

## 2018-12-26 PROCEDURE — 12000006 ZZH R&B IMCU INTERMEDIATE UMMC

## 2018-12-26 PROCEDURE — 25000128 H RX IP 250 OP 636: Performed by: STUDENT IN AN ORGANIZED HEALTH CARE EDUCATION/TRAINING PROGRAM

## 2018-12-26 PROCEDURE — 25000566 ZZH SEVOFLURANE, EA 15 MIN: Performed by: ORTHOPAEDIC SURGERY

## 2018-12-26 PROCEDURE — 25000128 H RX IP 250 OP 636: Performed by: ANESTHESIOLOGY

## 2018-12-26 PROCEDURE — 36000066 ZZH SURGERY LEVEL 4 W FLUORO 1ST 30 MIN - UMMC: Performed by: ORTHOPAEDIC SURGERY

## 2018-12-26 PROCEDURE — 86850 RBC ANTIBODY SCREEN: CPT | Performed by: ANESTHESIOLOGY

## 2018-12-26 PROCEDURE — 71000015 ZZH RECOVERY PHASE 1 LEVEL 2 EA ADDTL HR: Performed by: ORTHOPAEDIC SURGERY

## 2018-12-26 PROCEDURE — 71000014 ZZH RECOVERY PHASE 1 LEVEL 2 FIRST HR: Performed by: ORTHOPAEDIC SURGERY

## 2018-12-26 PROCEDURE — C9290 INJ, BUPIVACAINE LIPOSOME: HCPCS | Performed by: STUDENT IN AN ORGANIZED HEALTH CARE EDUCATION/TRAINING PROGRAM

## 2018-12-26 PROCEDURE — 40000278 XR SURGERY CARM FLUORO LESS THAN 5 MIN: Mod: TC

## 2018-12-26 PROCEDURE — 37000009 ZZH ANESTHESIA TECHNICAL FEE, EACH ADDTL 15 MIN: Performed by: ORTHOPAEDIC SURGERY

## 2018-12-26 PROCEDURE — 25000125 ZZHC RX 250: Performed by: STUDENT IN AN ORGANIZED HEALTH CARE EDUCATION/TRAINING PROGRAM

## 2018-12-26 PROCEDURE — 86901 BLOOD TYPING SEROLOGIC RH(D): CPT | Performed by: ANESTHESIOLOGY

## 2018-12-26 PROCEDURE — 25000128 H RX IP 250 OP 636: Performed by: NURSE ANESTHETIST, CERTIFIED REGISTERED

## 2018-12-26 PROCEDURE — 25000128 H RX IP 250 OP 636: Performed by: ORTHOPAEDIC SURGERY

## 2018-12-26 PROCEDURE — 25000132 ZZH RX MED GY IP 250 OP 250 PS 637: Performed by: ORTHOPAEDIC SURGERY

## 2018-12-26 PROCEDURE — 25000125 ZZHC RX 250: Performed by: NURSE ANESTHETIST, CERTIFIED REGISTERED

## 2018-12-26 PROCEDURE — 0PSB04Z REPOSITION LEFT CLAVICLE WITH INTERNAL FIXATION DEVICE, OPEN APPROACH: ICD-10-PCS | Performed by: ORTHOPAEDIC SURGERY

## 2018-12-26 PROCEDURE — 37000008 ZZH ANESTHESIA TECHNICAL FEE, 1ST 30 MIN: Performed by: ORTHOPAEDIC SURGERY

## 2018-12-26 PROCEDURE — 82947 ASSAY GLUCOSE BLOOD QUANT: CPT | Performed by: ANESTHESIOLOGY

## 2018-12-26 PROCEDURE — 27211024 ZZHC OR SUPPLY OTHER OPNP: Performed by: ORTHOPAEDIC SURGERY

## 2018-12-26 PROCEDURE — C1713 ANCHOR/SCREW BN/BN,TIS/BN: HCPCS | Performed by: ORTHOPAEDIC SURGERY

## 2018-12-26 PROCEDURE — 25000125 ZZHC RX 250: Performed by: ORTHOPAEDIC SURGERY

## 2018-12-26 PROCEDURE — 27210794 ZZH OR GENERAL SUPPLY STERILE: Performed by: ORTHOPAEDIC SURGERY

## 2018-12-26 PROCEDURE — 27110028 ZZH OR GENERAL SUPPLY NON-STERILE: Performed by: ORTHOPAEDIC SURGERY

## 2018-12-26 PROCEDURE — 71045 X-RAY EXAM CHEST 1 VIEW: CPT

## 2018-12-26 PROCEDURE — 86900 BLOOD TYPING SEROLOGIC ABO: CPT | Performed by: ANESTHESIOLOGY

## 2018-12-26 PROCEDURE — 25000132 ZZH RX MED GY IP 250 OP 250 PS 637: Performed by: STUDENT IN AN ORGANIZED HEALTH CARE EDUCATION/TRAINING PROGRAM

## 2018-12-26 PROCEDURE — 99223 1ST HOSP IP/OBS HIGH 75: CPT | Mod: AI | Performed by: INTERNAL MEDICINE

## 2018-12-26 DEVICE — IMPLANTABLE DEVICE: Type: IMPLANTABLE DEVICE | Site: CLAVICLE | Status: FUNCTIONAL

## 2018-12-26 DEVICE — IMP WIRE KIRSCHNER STRK 1.6MM 150MM 390164: Type: IMPLANTABLE DEVICE | Site: CLAVICLE | Status: FUNCTIONAL

## 2018-12-26 RX ORDER — FENTANYL CITRATE 50 UG/ML
INJECTION, SOLUTION INTRAMUSCULAR; INTRAVENOUS PRN
Status: DISCONTINUED | OUTPATIENT
Start: 2018-12-26 | End: 2018-12-26

## 2018-12-26 RX ORDER — FENTANYL CITRATE 50 UG/ML
25-50 INJECTION, SOLUTION INTRAMUSCULAR; INTRAVENOUS
Status: DISCONTINUED | OUTPATIENT
Start: 2018-12-26 | End: 2018-12-26 | Stop reason: HOSPADM

## 2018-12-26 RX ORDER — HYDROMORPHONE HYDROCHLORIDE 1 MG/ML
.3-.5 INJECTION, SOLUTION INTRAMUSCULAR; INTRAVENOUS; SUBCUTANEOUS EVERY 5 MIN PRN
Status: DISCONTINUED | OUTPATIENT
Start: 2018-12-26 | End: 2018-12-26 | Stop reason: HOSPADM

## 2018-12-26 RX ORDER — GABAPENTIN 100 MG/1
300 CAPSULE ORAL ONCE
Status: DISCONTINUED | OUTPATIENT
Start: 2018-12-26 | End: 2018-12-26 | Stop reason: HOSPADM

## 2018-12-26 RX ORDER — SODIUM CHLORIDE, SODIUM LACTATE, POTASSIUM CHLORIDE, CALCIUM CHLORIDE 600; 310; 30; 20 MG/100ML; MG/100ML; MG/100ML; MG/100ML
INJECTION, SOLUTION INTRAVENOUS CONTINUOUS
Status: DISCONTINUED | OUTPATIENT
Start: 2018-12-26 | End: 2018-12-26 | Stop reason: HOSPADM

## 2018-12-26 RX ORDER — PROPOFOL 10 MG/ML
INJECTION, EMULSION INTRAVENOUS PRN
Status: DISCONTINUED | OUTPATIENT
Start: 2018-12-26 | End: 2018-12-26

## 2018-12-26 RX ORDER — BUPIVACAINE HYDROCHLORIDE AND EPINEPHRINE 2.5; 5 MG/ML; UG/ML
INJECTION, SOLUTION INFILTRATION; PERINEURAL PRN
Status: DISCONTINUED | OUTPATIENT
Start: 2018-12-26 | End: 2018-12-26

## 2018-12-26 RX ORDER — ONDANSETRON 2 MG/ML
INJECTION INTRAMUSCULAR; INTRAVENOUS PRN
Status: DISCONTINUED | OUTPATIENT
Start: 2018-12-26 | End: 2018-12-26

## 2018-12-26 RX ORDER — IOPAMIDOL 755 MG/ML
100 INJECTION, SOLUTION INTRAVASCULAR ONCE
Status: COMPLETED | OUTPATIENT
Start: 2018-12-26 | End: 2018-12-26

## 2018-12-26 RX ORDER — SODIUM CHLORIDE, SODIUM LACTATE, POTASSIUM CHLORIDE, CALCIUM CHLORIDE 600; 310; 30; 20 MG/100ML; MG/100ML; MG/100ML; MG/100ML
INJECTION, SOLUTION INTRAVENOUS CONTINUOUS PRN
Status: DISCONTINUED | OUTPATIENT
Start: 2018-12-26 | End: 2018-12-26

## 2018-12-26 RX ORDER — OXYCODONE HYDROCHLORIDE 5 MG/1
5-10 TABLET ORAL EVERY 6 HOURS PRN
Status: DISCONTINUED | OUTPATIENT
Start: 2018-12-26 | End: 2018-12-27

## 2018-12-26 RX ORDER — LANOLIN ALCOHOL/MO/W.PET/CERES
200 CREAM (GRAM) TOPICAL DAILY
Status: DISCONTINUED | OUTPATIENT
Start: 2018-12-27 | End: 2018-12-30 | Stop reason: HOSPADM

## 2018-12-26 RX ORDER — OXYCODONE HYDROCHLORIDE 5 MG/1
5-10 TABLET ORAL EVERY 4 HOURS PRN
Qty: 40 TABLET | Refills: 0 | Status: SHIPPED | OUTPATIENT
Start: 2018-12-26 | End: 2018-12-30

## 2018-12-26 RX ORDER — ONDANSETRON 4 MG/1
4 TABLET, ORALLY DISINTEGRATING ORAL EVERY 30 MIN PRN
Status: DISCONTINUED | OUTPATIENT
Start: 2018-12-26 | End: 2018-12-26 | Stop reason: HOSPADM

## 2018-12-26 RX ORDER — NALOXONE HYDROCHLORIDE 0.4 MG/ML
.1-.4 INJECTION, SOLUTION INTRAMUSCULAR; INTRAVENOUS; SUBCUTANEOUS
Status: DISCONTINUED | OUTPATIENT
Start: 2018-12-26 | End: 2018-12-30 | Stop reason: HOSPADM

## 2018-12-26 RX ORDER — HYDROMORPHONE HCL/0.9% NACL/PF 0.2MG/0.2
0.2 SYRINGE (ML) INTRAVENOUS
Status: DISCONTINUED | OUTPATIENT
Start: 2018-12-26 | End: 2018-12-27

## 2018-12-26 RX ORDER — IBUPROFEN 200 MG
400 TABLET ORAL EVERY 6 HOURS
Status: DISCONTINUED | OUTPATIENT
Start: 2018-12-26 | End: 2018-12-27

## 2018-12-26 RX ORDER — FLUMAZENIL 0.1 MG/ML
0.2 INJECTION, SOLUTION INTRAVENOUS
Status: DISCONTINUED | OUTPATIENT
Start: 2018-12-26 | End: 2018-12-26 | Stop reason: HOSPADM

## 2018-12-26 RX ORDER — ONDANSETRON 2 MG/ML
4 INJECTION INTRAMUSCULAR; INTRAVENOUS EVERY 30 MIN PRN
Status: DISCONTINUED | OUTPATIENT
Start: 2018-12-26 | End: 2018-12-26 | Stop reason: HOSPADM

## 2018-12-26 RX ORDER — GABAPENTIN 300 MG/1
600 CAPSULE ORAL 3 TIMES DAILY
Status: DISCONTINUED | OUTPATIENT
Start: 2018-12-26 | End: 2018-12-30 | Stop reason: HOSPADM

## 2018-12-26 RX ORDER — NALOXONE HYDROCHLORIDE 0.4 MG/ML
.1-.4 INJECTION, SOLUTION INTRAMUSCULAR; INTRAVENOUS; SUBCUTANEOUS
Status: DISCONTINUED | OUTPATIENT
Start: 2018-12-26 | End: 2018-12-26

## 2018-12-26 RX ORDER — CEFAZOLIN SODIUM 1 G/3ML
1 INJECTION, POWDER, FOR SOLUTION INTRAMUSCULAR; INTRAVENOUS SEE ADMIN INSTRUCTIONS
Status: DISCONTINUED | OUTPATIENT
Start: 2018-12-26 | End: 2018-12-26 | Stop reason: HOSPADM

## 2018-12-26 RX ORDER — ACETAMINOPHEN 325 MG/1
650 TABLET ORAL EVERY 4 HOURS PRN
Status: DISCONTINUED | OUTPATIENT
Start: 2018-12-26 | End: 2018-12-27

## 2018-12-26 RX ORDER — NALOXONE HYDROCHLORIDE 0.4 MG/ML
.1-.4 INJECTION, SOLUTION INTRAMUSCULAR; INTRAVENOUS; SUBCUTANEOUS
Status: DISCONTINUED | OUTPATIENT
Start: 2018-12-26 | End: 2018-12-26 | Stop reason: HOSPADM

## 2018-12-26 RX ORDER — ONDANSETRON 4 MG/1
4 TABLET, ORALLY DISINTEGRATING ORAL EVERY 6 HOURS PRN
Status: DISCONTINUED | OUTPATIENT
Start: 2018-12-26 | End: 2018-12-26

## 2018-12-26 RX ORDER — POLYETHYLENE GLYCOL 3350 17 G/17G
17 POWDER, FOR SOLUTION ORAL DAILY PRN
Status: DISCONTINUED | OUTPATIENT
Start: 2018-12-26 | End: 2018-12-30 | Stop reason: HOSPADM

## 2018-12-26 RX ORDER — ONDANSETRON 2 MG/ML
4 INJECTION INTRAMUSCULAR; INTRAVENOUS EVERY 6 HOURS PRN
Status: DISCONTINUED | OUTPATIENT
Start: 2018-12-26 | End: 2018-12-30 | Stop reason: HOSPADM

## 2018-12-26 RX ORDER — MAGNESIUM HYDROXIDE 1200 MG/15ML
LIQUID ORAL PRN
Status: DISCONTINUED | OUTPATIENT
Start: 2018-12-26 | End: 2018-12-26 | Stop reason: HOSPADM

## 2018-12-26 RX ORDER — CEFAZOLIN SODIUM 2 G/100ML
2 INJECTION, SOLUTION INTRAVENOUS
Status: COMPLETED | OUTPATIENT
Start: 2018-12-26 | End: 2018-12-26

## 2018-12-26 RX ORDER — LIDOCAINE 40 MG/G
CREAM TOPICAL
Status: DISCONTINUED | OUTPATIENT
Start: 2018-12-26 | End: 2018-12-30 | Stop reason: HOSPADM

## 2018-12-26 RX ORDER — OXYCODONE HYDROCHLORIDE 5 MG/1
5 TABLET ORAL EVERY 4 HOURS PRN
Status: DISCONTINUED | OUTPATIENT
Start: 2018-12-26 | End: 2018-12-26

## 2018-12-26 RX ORDER — ONDANSETRON 4 MG/1
4 TABLET, ORALLY DISINTEGRATING ORAL EVERY 6 HOURS PRN
Status: DISCONTINUED | OUTPATIENT
Start: 2018-12-26 | End: 2018-12-30 | Stop reason: HOSPADM

## 2018-12-26 RX ORDER — KETOROLAC TROMETHAMINE 30 MG/ML
INJECTION, SOLUTION INTRAMUSCULAR; INTRAVENOUS PRN
Status: DISCONTINUED | OUTPATIENT
Start: 2018-12-26 | End: 2018-12-26

## 2018-12-26 RX ORDER — BUPIVACAINE HYDROCHLORIDE 2.5 MG/ML
INJECTION, SOLUTION INFILTRATION; PERINEURAL PRN
Status: DISCONTINUED | OUTPATIENT
Start: 2018-12-26 | End: 2018-12-26 | Stop reason: HOSPADM

## 2018-12-26 RX ORDER — ACETAMINOPHEN 325 MG/1
975 TABLET ORAL ONCE
Status: DISCONTINUED | OUTPATIENT
Start: 2018-12-26 | End: 2018-12-26 | Stop reason: HOSPADM

## 2018-12-26 RX ORDER — OXYCODONE HYDROCHLORIDE 5 MG/1
5-10 TABLET ORAL
Status: DISCONTINUED | OUTPATIENT
Start: 2018-12-26 | End: 2018-12-26

## 2018-12-26 RX ORDER — LIDOCAINE 40 MG/G
CREAM TOPICAL
Status: DISCONTINUED | OUTPATIENT
Start: 2018-12-26 | End: 2018-12-26

## 2018-12-26 RX ORDER — OXYCODONE HYDROCHLORIDE 5 MG/1
5-10 TABLET ORAL EVERY 4 HOURS PRN
Status: DISCONTINUED | OUTPATIENT
Start: 2018-12-26 | End: 2018-12-26

## 2018-12-26 RX ORDER — ACETAMINOPHEN 500 MG
1000 TABLET ORAL EVERY 8 HOURS
Status: DISCONTINUED | OUTPATIENT
Start: 2018-12-26 | End: 2018-12-26

## 2018-12-26 RX ORDER — HYDROMORPHONE HYDROCHLORIDE 1 MG/ML
0.2 INJECTION, SOLUTION INTRAMUSCULAR; INTRAVENOUS; SUBCUTANEOUS
Status: DISCONTINUED | OUTPATIENT
Start: 2018-12-26 | End: 2018-12-26

## 2018-12-26 RX ORDER — DEXAMETHASONE SODIUM PHOSPHATE 4 MG/ML
INJECTION, SOLUTION INTRA-ARTICULAR; INTRALESIONAL; INTRAMUSCULAR; INTRAVENOUS; SOFT TISSUE PRN
Status: DISCONTINUED | OUTPATIENT
Start: 2018-12-26 | End: 2018-12-26

## 2018-12-26 RX ORDER — AMOXICILLIN 250 MG
1-2 CAPSULE ORAL 2 TIMES DAILY
Qty: 120 TABLET | Refills: 0 | Status: SHIPPED | OUTPATIENT
Start: 2018-12-26 | End: 2019-03-07

## 2018-12-26 RX ORDER — ONDANSETRON 2 MG/ML
4 INJECTION INTRAMUSCULAR; INTRAVENOUS EVERY 6 HOURS PRN
Status: DISCONTINUED | OUTPATIENT
Start: 2018-12-26 | End: 2018-12-26

## 2018-12-26 RX ORDER — LIDOCAINE HYDROCHLORIDE 20 MG/ML
INJECTION, SOLUTION INFILTRATION; PERINEURAL PRN
Status: DISCONTINUED | OUTPATIENT
Start: 2018-12-26 | End: 2018-12-26

## 2018-12-26 RX ADMIN — MIDAZOLAM HYDROCHLORIDE 1 MG: 1 INJECTION, SOLUTION INTRAMUSCULAR; INTRAVENOUS at 14:14

## 2018-12-26 RX ADMIN — PHENYLEPHRINE HYDROCHLORIDE 100 MCG: 10 INJECTION, SOLUTION INTRAMUSCULAR; INTRAVENOUS; SUBCUTANEOUS at 16:58

## 2018-12-26 RX ADMIN — BUPIVACAINE HYDROCHLORIDE AND EPINEPHRINE BITARTRATE 10 ML: 2.5; .005 INJECTION, SOLUTION INFILTRATION; PERINEURAL at 14:20

## 2018-12-26 RX ADMIN — PHENYLEPHRINE HYDROCHLORIDE 100 MCG: 10 INJECTION, SOLUTION INTRAMUSCULAR; INTRAVENOUS; SUBCUTANEOUS at 15:33

## 2018-12-26 RX ADMIN — CEFAZOLIN SODIUM 2 G: 2 INJECTION, SOLUTION INTRAVENOUS at 15:30

## 2018-12-26 RX ADMIN — FENTANYL CITRATE 50 MCG: 50 INJECTION, SOLUTION INTRAMUSCULAR; INTRAVENOUS at 15:09

## 2018-12-26 RX ADMIN — ONDANSETRON 4 MG: 2 INJECTION INTRAMUSCULAR; INTRAVENOUS at 17:02

## 2018-12-26 RX ADMIN — MIDAZOLAM 2 MG: 1 INJECTION INTRAMUSCULAR; INTRAVENOUS at 15:00

## 2018-12-26 RX ADMIN — PHENYLEPHRINE HYDROCHLORIDE 100 MCG: 10 INJECTION, SOLUTION INTRAMUSCULAR; INTRAVENOUS; SUBCUTANEOUS at 15:57

## 2018-12-26 RX ADMIN — KETOROLAC TROMETHAMINE 15 MG: 30 INJECTION, SOLUTION INTRAMUSCULAR at 17:24

## 2018-12-26 RX ADMIN — PROPOFOL 50 MG: 10 INJECTION, EMULSION INTRAVENOUS at 16:56

## 2018-12-26 RX ADMIN — PHENYLEPHRINE HYDROCHLORIDE 100 MCG: 10 INJECTION, SOLUTION INTRAMUSCULAR; INTRAVENOUS; SUBCUTANEOUS at 15:27

## 2018-12-26 RX ADMIN — SODIUM CHLORIDE 70 ML: 9 INJECTION, SOLUTION INTRAVENOUS at 20:35

## 2018-12-26 RX ADMIN — SODIUM CHLORIDE, POTASSIUM CHLORIDE, SODIUM LACTATE AND CALCIUM CHLORIDE: 600; 310; 30; 20 INJECTION, SOLUTION INTRAVENOUS at 15:00

## 2018-12-26 RX ADMIN — LIDOCAINE HYDROCHLORIDE 40 MG: 20 INJECTION, SOLUTION INFILTRATION; PERINEURAL at 15:09

## 2018-12-26 RX ADMIN — PROPOFOL 200 MG: 10 INJECTION, EMULSION INTRAVENOUS at 15:09

## 2018-12-26 RX ADMIN — PHENYLEPHRINE HYDROCHLORIDE 100 MCG: 10 INJECTION, SOLUTION INTRAMUSCULAR; INTRAVENOUS; SUBCUTANEOUS at 15:16

## 2018-12-26 RX ADMIN — DEXAMETHASONE SODIUM PHOSPHATE 4 MG: 4 INJECTION, SOLUTION INTRAMUSCULAR; INTRAVENOUS at 15:22

## 2018-12-26 RX ADMIN — BUPIVACAINE 10 ML: 13.3 INJECTION, SUSPENSION, LIPOSOMAL INFILTRATION at 14:20

## 2018-12-26 RX ADMIN — OXYCODONE HYDROCHLORIDE 5 MG: 5 TABLET ORAL at 22:43

## 2018-12-26 RX ADMIN — FENTANYL CITRATE 50 MCG: 50 INJECTION, SOLUTION INTRAMUSCULAR; INTRAVENOUS at 14:14

## 2018-12-26 RX ADMIN — SODIUM CHLORIDE, POTASSIUM CHLORIDE, SODIUM LACTATE AND CALCIUM CHLORIDE 1000 ML: 600; 310; 30; 20 INJECTION, SOLUTION INTRAVENOUS at 21:26

## 2018-12-26 RX ADMIN — IOPAMIDOL 90 ML: 755 INJECTION, SOLUTION INTRAVENOUS at 20:34

## 2018-12-26 RX ADMIN — SUGAMMADEX 170 MG: 100 INJECTION, SOLUTION INTRAVENOUS at 17:22

## 2018-12-26 RX ADMIN — PHENYLEPHRINE HYDROCHLORIDE 150 MCG: 10 INJECTION, SOLUTION INTRAMUSCULAR; INTRAVENOUS; SUBCUTANEOUS at 16:29

## 2018-12-26 RX ADMIN — FENTANYL CITRATE 50 MCG: 50 INJECTION, SOLUTION INTRAMUSCULAR; INTRAVENOUS at 15:01

## 2018-12-26 RX ADMIN — PROCHLORPERAZINE EDISYLATE 5 MG: 5 INJECTION INTRAMUSCULAR; INTRAVENOUS at 17:58

## 2018-12-26 RX ADMIN — ROCURONIUM BROMIDE 50 MG: 10 INJECTION INTRAVENOUS at 15:09

## 2018-12-26 ASSESSMENT — MIFFLIN-ST. JEOR: SCORE: 1706.13

## 2018-12-26 NOTE — BRIEF OP NOTE
[facility], [place]    Brief Operative Note    Pre-operative diagnosis: Left Clavicle Fracture  Post-operative diagnosis Left clavicle fracture  Procedure: Procedure(s):  OPEN REDUCTION INTERNAL FIXATION CLAVICLE LEFT  Surgeon: Surgeon(s) and Role:     * Daya Leach MD - Primary     * Eli Kendall MD - Resident - Assisting  Anesthesia: General   Estimated blood loss: Minimal  Drains: None  Specimens: * No specimens in log *  Findings:   See op note.  Complications: None.  Implants: Stehekin plate with associated screws, three 2.7 mm lag screws    Post op plan:  NWMATHEW GAFFNEY sling x 2 weeks  Discharge home today  Follow-up in 2 weeks with Dr. Hany Kendall MD

## 2018-12-26 NOTE — PROGRESS NOTES
S. Patient was seen today, at S  preop for measurements/delivery of an Ultrasling IV for (left/right) shoulder as ordered by .    O/goal. To reduce motion and help with post-op support    A. At this time, I have provided patient with a size Medium Don Cori Ultrasling IV. Straps were trimmed and adjusted to achieve a custom fit for the patient. Brace was then left at the surgery control desk for physicians to place on the patient in the OR     P. Patient/staffing have been instructed to contact our facility with any future questions and/or concerns.  Arley JESSICA,CATHIE.    Nehemias Ultrasling IV Instructions

## 2018-12-26 NOTE — ANESTHESIA PROCEDURE NOTES
Peripheral Nerve Block Procedure Note    Staff:     Anesthesiologist:  Karla Dietrich MD    Resident/CRNA:  Zen Hart MD    Block performed by resident/CRNA in the presence of a teaching physician    Location: Pre-op  Procedure Start/Stop TImes:      12/26/2018 2:16 PM     12/26/2018 2:22 PM    patient identified, IV checked, site marked, risks and benefits discussed, informed consent, monitors and equipment checked, pre-op evaluation, at physician/surgeon's request and post-op pain management      Correct Patient: Yes      Correct Position: Yes      Correct Site: Yes      Correct Procedure: Yes      Correct Laterality:  Yes    Site Marked:  Yes  Procedure details:     Procedure:  Interscalene and Other (supraficial cervical)    Laterality:  Left    Position:  Sitting    Sterile Prep: chloraprep, mask and sterile gloves      Needle:  Other    Needle gauge:  21    Needle length (mm):  110    Ultrasound: Yes      Ultrasound used to identify targeted nerve, plexus, or vascular structure and placed a needle adjacent to it      Permanent Image entered into patiient's record      Abnormal pain on injection: No      Blood Aspirated: No      Paresthesias:  No    Bleeding at site: No      Bolus via:  Needle    Infusion Method:  Single Shot    Blood aspirated via catheter: No      Complications:  None  Assessment/Narrative:      Left side interscalene with 7.5 mL of 0.25% Bupivacaine with epinephrine and 7.5 mL of liposomal bupivacaine.  Left side superficial cervical nerve block with 2.5 mL of 0.25% bupivacaine and 2.5 mL of liposomal bupivacaine.

## 2018-12-26 NOTE — OR NURSING
Superficial cervical plexus block and interscalene exparel block on left done and tolerated well.

## 2018-12-26 NOTE — ANESTHESIA CARE TRANSFER NOTE
Patient: Quang Carolina    Procedure(s):  OPEN REDUCTION INTERNAL FIXATION CLAVICLE LEFT    Diagnosis: Left Clavicle Fracture  Diagnosis Additional Information: No value filed.    Anesthesia Type:   No value filed.     Note:  Airway :Face Mask  Patient transferred to:PACU  Comments: To PACU with 02, Spont RR. Monitors applied, VSS, PIV/airway patent, patient encouraged to take deep breaths and cough, Report to RN all questions/concerns answered.   Handoff Report: Identifed the Patient, Identified the Reponsible Provider, Reviewed the pertinent medical history, Discussed the surgical course, Reviewed Intra-OP anesthesia mangement and issues during anesthesia, Set expectations for post-procedure period and Allowed opportunity for questions and acknowledgement of understanding      Vitals: (Last set prior to Anesthesia Care Transfer)    CRNA VITALS  12/26/2018 1703 - 12/26/2018 1743      12/26/2018             NIBP:  117/70    Pulse:  86    Temp:  36.4  C (97.5  F)    SpO2:  92 %    Resp Rate (observed):  10                Electronically Signed By: COURTNEY Jarrell CRNA  December 26, 2018  5:43 PM

## 2018-12-27 ENCOUNTER — APPOINTMENT (OUTPATIENT)
Dept: INTERVENTIONAL RADIOLOGY/VASCULAR | Facility: CLINIC | Age: 53
End: 2018-12-27
Attending: RADIOLOGY PRACTITIONER ASSISTANT
Payer: COMMERCIAL

## 2018-12-27 ENCOUNTER — APPOINTMENT (OUTPATIENT)
Dept: CARDIOLOGY | Facility: CLINIC | Age: 53
End: 2018-12-27
Attending: ORTHOPAEDIC SURGERY
Payer: COMMERCIAL

## 2018-12-27 LAB
ANION GAP SERPL CALCULATED.3IONS-SCNC: 6 MMOL/L (ref 3–14)
ANION GAP SERPL CALCULATED.3IONS-SCNC: 6 MMOL/L (ref 3–14)
APPEARANCE FLD: NORMAL
BASOPHILS NFR FLD MANUAL: 6 %
BUN SERPL-MCNC: 19 MG/DL (ref 7–30)
BUN SERPL-MCNC: 19 MG/DL (ref 7–30)
CALCIUM SERPL-MCNC: 8.6 MG/DL (ref 8.5–10.1)
CALCIUM SERPL-MCNC: 8.6 MG/DL (ref 8.5–10.1)
CHLORIDE SERPL-SCNC: 103 MMOL/L (ref 94–109)
CHLORIDE SERPL-SCNC: 104 MMOL/L (ref 94–109)
CO2 SERPL-SCNC: 26 MMOL/L (ref 20–32)
CO2 SERPL-SCNC: 26 MMOL/L (ref 20–32)
COLOR FLD: NORMAL
CREAT SERPL-MCNC: 0.86 MG/DL (ref 0.66–1.25)
CREAT SERPL-MCNC: 0.87 MG/DL (ref 0.66–1.25)
EOSINOPHIL NFR FLD MANUAL: 35 %
ERYTHROCYTE [DISTWIDTH] IN BLOOD BY AUTOMATED COUNT: 13 % (ref 10–15)
ERYTHROCYTE [DISTWIDTH] IN BLOOD BY AUTOMATED COUNT: 13 % (ref 10–15)
GFR SERPL CREATININE-BSD FRML MDRD: >90 ML/MIN/{1.73_M2}
GFR SERPL CREATININE-BSD FRML MDRD: >90 ML/MIN/{1.73_M2}
GLUCOSE FLD-MCNC: 109 MG/DL
GLUCOSE SERPL-MCNC: 108 MG/DL (ref 70–99)
GLUCOSE SERPL-MCNC: 115 MG/DL (ref 70–99)
GRAM STN SPEC: NORMAL
HCT VFR BLD AUTO: 42.2 % (ref 40–53)
HCT VFR BLD AUTO: 42.2 % (ref 40–53)
HGB BLD-MCNC: 14 G/DL (ref 13.3–17.7)
HGB BLD-MCNC: 14.3 G/DL (ref 13.3–17.7)
INR PPP: 1.06 (ref 0.86–1.14)
LDH FLD L TO P-CCNC: 229 U/L
LDH SERPL L TO P-CCNC: 151 U/L (ref 85–227)
LYMPHOCYTES NFR FLD MANUAL: 22 %
MCH RBC QN AUTO: 31.5 PG (ref 26.5–33)
MCH RBC QN AUTO: 31.8 PG (ref 26.5–33)
MCHC RBC AUTO-ENTMCNC: 33.2 G/DL (ref 31.5–36.5)
MCHC RBC AUTO-ENTMCNC: 33.9 G/DL (ref 31.5–36.5)
MCV RBC AUTO: 94 FL (ref 78–100)
MCV RBC AUTO: 95 FL (ref 78–100)
NEUTS BAND NFR FLD MANUAL: 3 %
OTHER CELLS FLD MANUAL: 34 %
PH FLD: 7.6 PH
PLATELET # BLD AUTO: 277 10E9/L (ref 150–450)
PLATELET # BLD AUTO: 315 10E9/L (ref 150–450)
POTASSIUM SERPL-SCNC: 4.5 MMOL/L (ref 3.4–5.3)
POTASSIUM SERPL-SCNC: 5.1 MMOL/L (ref 3.4–5.3)
PROT FLD-MCNC: 4.5 G/DL
PROT SERPL-MCNC: 6.5 G/DL (ref 6.8–8.8)
RBC # BLD AUTO: 4.45 10E12/L (ref 4.4–5.9)
RBC # BLD AUTO: 4.5 10E12/L (ref 4.4–5.9)
SODIUM SERPL-SCNC: 136 MMOL/L (ref 133–144)
SODIUM SERPL-SCNC: 136 MMOL/L (ref 133–144)
SPECIMEN SOURCE FLD: NORMAL
SPECIMEN SOURCE: NORMAL
WBC # BLD AUTO: 13 10E9/L (ref 4–11)
WBC # BLD AUTO: 14.2 10E9/L (ref 4–11)
WBC # FLD AUTO: 1090 /UL

## 2018-12-27 PROCEDURE — 25500064 ZZH RX 255 OP 636: Performed by: ORTHOPAEDIC SURGERY

## 2018-12-27 PROCEDURE — 25000125 ZZHC RX 250: Performed by: RADIOLOGY

## 2018-12-27 PROCEDURE — 83986 ASSAY PH BODY FLUID NOS: CPT | Performed by: ORTHOPAEDIC SURGERY

## 2018-12-27 PROCEDURE — 32555 ASPIRATE PLEURA W/ IMAGING: CPT

## 2018-12-27 PROCEDURE — 25000132 ZZH RX MED GY IP 250 OP 250 PS 637: Performed by: STUDENT IN AN ORGANIZED HEALTH CARE EDUCATION/TRAINING PROGRAM

## 2018-12-27 PROCEDURE — 82945 GLUCOSE OTHER FLUID: CPT | Performed by: ORTHOPAEDIC SURGERY

## 2018-12-27 PROCEDURE — 0W9B3ZZ DRAINAGE OF LEFT PLEURAL CAVITY, PERCUTANEOUS APPROACH: ICD-10-PCS | Performed by: RADIOLOGY

## 2018-12-27 PROCEDURE — 83615 LACTATE (LD) (LDH) ENZYME: CPT | Performed by: STUDENT IN AN ORGANIZED HEALTH CARE EDUCATION/TRAINING PROGRAM

## 2018-12-27 PROCEDURE — 80048 BASIC METABOLIC PNL TOTAL CA: CPT | Performed by: STUDENT IN AN ORGANIZED HEALTH CARE EDUCATION/TRAINING PROGRAM

## 2018-12-27 PROCEDURE — 25000132 ZZH RX MED GY IP 250 OP 250 PS 637: Performed by: ORTHOPAEDIC SURGERY

## 2018-12-27 PROCEDURE — 27210903 ZZH KIT CR5

## 2018-12-27 PROCEDURE — 89051 BODY FLUID CELL COUNT: CPT | Performed by: ORTHOPAEDIC SURGERY

## 2018-12-27 PROCEDURE — 36415 COLL VENOUS BLD VENIPUNCTURE: CPT | Performed by: STUDENT IN AN ORGANIZED HEALTH CARE EDUCATION/TRAINING PROGRAM

## 2018-12-27 PROCEDURE — 83615 LACTATE (LD) (LDH) ENZYME: CPT | Performed by: ORTHOPAEDIC SURGERY

## 2018-12-27 PROCEDURE — 27211039 ZZH NEEDLE CR2

## 2018-12-27 PROCEDURE — 87070 CULTURE OTHR SPECIMN AEROBIC: CPT | Performed by: ORTHOPAEDIC SURGERY

## 2018-12-27 PROCEDURE — 99233 SBSQ HOSP IP/OBS HIGH 50: CPT | Mod: GC | Performed by: INTERNAL MEDICINE

## 2018-12-27 PROCEDURE — 84157 ASSAY OF PROTEIN OTHER: CPT | Performed by: ORTHOPAEDIC SURGERY

## 2018-12-27 PROCEDURE — 12000006 ZZH R&B IMCU INTERMEDIATE UMMC

## 2018-12-27 PROCEDURE — 40000264 ECHOCARDIOGRAM COMPLETE

## 2018-12-27 PROCEDURE — 93306 TTE W/DOPPLER COMPLETE: CPT | Mod: 26 | Performed by: INTERNAL MEDICINE

## 2018-12-27 PROCEDURE — 85027 COMPLETE CBC AUTOMATED: CPT | Performed by: STUDENT IN AN ORGANIZED HEALTH CARE EDUCATION/TRAINING PROGRAM

## 2018-12-27 PROCEDURE — 87205 SMEAR GRAM STAIN: CPT | Performed by: ORTHOPAEDIC SURGERY

## 2018-12-27 PROCEDURE — 27210732 ZZH ACCESSORY CR1

## 2018-12-27 PROCEDURE — 25000128 H RX IP 250 OP 636: Performed by: STUDENT IN AN ORGANIZED HEALTH CARE EDUCATION/TRAINING PROGRAM

## 2018-12-27 PROCEDURE — 84155 ASSAY OF PROTEIN SERUM: CPT | Performed by: STUDENT IN AN ORGANIZED HEALTH CARE EDUCATION/TRAINING PROGRAM

## 2018-12-27 PROCEDURE — 85610 PROTHROMBIN TIME: CPT | Performed by: STUDENT IN AN ORGANIZED HEALTH CARE EDUCATION/TRAINING PROGRAM

## 2018-12-27 RX ORDER — HYDROMORPHONE HYDROCHLORIDE 1 MG/ML
.3-.5 INJECTION, SOLUTION INTRAMUSCULAR; INTRAVENOUS; SUBCUTANEOUS
Status: COMPLETED | OUTPATIENT
Start: 2018-12-27 | End: 2018-12-27

## 2018-12-27 RX ORDER — NICOTINE POLACRILEX 4 MG
15-30 LOZENGE BUCCAL
Status: CANCELLED | OUTPATIENT
Start: 2018-12-27

## 2018-12-27 RX ORDER — LIDOCAINE 40 MG/G
CREAM TOPICAL
Status: CANCELLED | OUTPATIENT
Start: 2018-12-27

## 2018-12-27 RX ORDER — DEXTROSE MONOHYDRATE 25 G/50ML
25-50 INJECTION, SOLUTION INTRAVENOUS
Status: CANCELLED | OUTPATIENT
Start: 2018-12-27

## 2018-12-27 RX ORDER — ACETAMINOPHEN 325 MG/1
975 TABLET ORAL
Status: DISCONTINUED | OUTPATIENT
Start: 2018-12-27 | End: 2018-12-30 | Stop reason: HOSPADM

## 2018-12-27 RX ORDER — HYDROMORPHONE HYDROCHLORIDE 1 MG/ML
.3-.5 INJECTION, SOLUTION INTRAMUSCULAR; INTRAVENOUS; SUBCUTANEOUS ONCE
Status: COMPLETED | OUTPATIENT
Start: 2018-12-27 | End: 2018-12-27

## 2018-12-27 RX ORDER — OXYCODONE HYDROCHLORIDE 5 MG/1
5-10 TABLET ORAL EVERY 4 HOURS PRN
Status: DISCONTINUED | OUTPATIENT
Start: 2018-12-27 | End: 2018-12-30 | Stop reason: HOSPADM

## 2018-12-27 RX ORDER — LIDOCAINE HYDROCHLORIDE 10 MG/ML
1-30 INJECTION, SOLUTION EPIDURAL; INFILTRATION; INTRACAUDAL; PERINEURAL
Status: COMPLETED | OUTPATIENT
Start: 2018-12-27 | End: 2018-12-27

## 2018-12-27 RX ADMIN — OXYCODONE HYDROCHLORIDE 10 MG: 5 TABLET ORAL at 11:38

## 2018-12-27 RX ADMIN — OXYCODONE HYDROCHLORIDE 10 MG: 5 TABLET ORAL at 06:40

## 2018-12-27 RX ADMIN — ACETAMINOPHEN 650 MG: 325 TABLET, FILM COATED ORAL at 00:32

## 2018-12-27 RX ADMIN — Medication 200 MG: at 09:23

## 2018-12-27 RX ADMIN — OXYCODONE HYDROCHLORIDE 10 MG: 5 TABLET ORAL at 19:41

## 2018-12-27 RX ADMIN — GABAPENTIN 600 MG: 300 CAPSULE ORAL at 09:23

## 2018-12-27 RX ADMIN — Medication 1 MG: at 00:26

## 2018-12-27 RX ADMIN — GABAPENTIN 600 MG: 300 CAPSULE ORAL at 19:40

## 2018-12-27 RX ADMIN — LIDOCAINE HYDROCHLORIDE 10 ML: 10 INJECTION, SOLUTION EPIDURAL; INFILTRATION; INTRACAUDAL; PERINEURAL at 18:13

## 2018-12-27 RX ADMIN — OXYCODONE HYDROCHLORIDE 10 MG: 5 TABLET ORAL at 15:35

## 2018-12-27 RX ADMIN — ACETAMINOPHEN 975 MG: 325 TABLET, FILM COATED ORAL at 11:38

## 2018-12-27 RX ADMIN — HUMAN ALBUMIN MICROSPHERES AND PERFLUTREN 6 ML: 10; .22 INJECTION, SOLUTION INTRAVENOUS at 09:15

## 2018-12-27 RX ADMIN — GABAPENTIN 600 MG: 300 CAPSULE ORAL at 15:34

## 2018-12-27 RX ADMIN — Medication 0.3 MG: at 06:49

## 2018-12-27 RX ADMIN — OXYCODONE HYDROCHLORIDE 5 MG: 5 TABLET ORAL at 00:26

## 2018-12-27 RX ADMIN — ACETAMINOPHEN 975 MG: 325 TABLET, FILM COATED ORAL at 17:29

## 2018-12-27 RX ADMIN — Medication 0.5 MG: at 21:22

## 2018-12-27 ASSESSMENT — ACTIVITIES OF DAILY LIVING (ADL)
ADLS_ACUITY_SCORE: 12

## 2018-12-27 ASSESSMENT — MIFFLIN-ST. JEOR: SCORE: 1705.85

## 2018-12-27 NOTE — OR NURSING
Dr. Bradshaw notified with patient's need for more o2 (increased to 6L Nasal Cannula)  to maintain sats of 90-91%.  Will get chest x-ray

## 2018-12-27 NOTE — PROGRESS NOTES
Admitted from [place] with increased o2 needs. Needing 6-7 L oxiplus. POD #0 ORIF of left clavicle. Absent LLL sounds. Mild SOB. Possible CT placement. Oxycodone given for pain. PIV TKO. Sipping on fluids. Waiting on plan from medicine.

## 2018-12-27 NOTE — ANESTHESIA CARE TRANSFER NOTE
Patient: Quang Carolina    Procedure(s):  OPEN REDUCTION INTERNAL FIXATION CLAVICLE LEFT    Diagnosis: Left Clavicle Fracture  Diagnosis Additional Information: No value filed.    Anesthesia Type:   No value filed.     Note:  Anesthesia Care Transfer Notewriter    Vitals: (Last set prior to Anesthesia Care Transfer)    CRNA VITALS  12/26/2018 1703 - 12/26/2018 1803      12/26/2018             NIBP:  117/70    Pulse:  86    Temp:  36.4  C (97.5  F)    SpO2:  92 %    Resp Rate (observed):  10                Electronically Signed By: Selene Bradshaw MD  December 26, 2018  6:17 PM
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
## 2018-12-27 NOTE — PROVIDER NOTIFICATION
Md called due to pt in a lot of pain when he got up to use the scale. Pt sat self up at side of bed and bed alarm went off and very upset with oxycodone being every 6 hours, states he took at rehab every 4 hours. Pt Iv dilaudid . MD reordered a one time IV dose and po 10mg oxycodone given. Pt also unbuckled splint from around chest due to discomfort after RN repeatedly told pt earlier that arm had to stay immobile. Splint orders are needed. Pt is slightly tremulous could be pain related was off ciwa since at rehab facility. Then when pt laid back down in bed he complained of burning in left lung area. Oxygen saturations 94% on 6L. Pt hob increased and MD aware, giving sign out to daytime.
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
## 2018-12-27 NOTE — PLAN OF CARE
S/B: Pt with increased pain and noncompliance of moving arm with right hand and unsnapping clips on immobilizer. Pt upset with pain regimen. MD called see note. Otherwise slept between cares.    A: A&Ox4. VSS. SB-SR 57-80's. Afebrile. Increased pain overnight, IV dilaudid 0.3mg given and oral oxycodone 10mg. No nausea. Tolerating npo diet. Pt wants to eat possible test today. Electrolytes WNL. Pt complaining of burning in left lung with moving. Md aware. No increased oxygen needs.    No intake/output data recorded.    Temp:  [97.5  F (36.4  C)-98.2  F (36.8  C)] 97.8  F (36.6  C)  Pulse:  [50-85] 50  Heart Rate:  [60-85] 62  Resp:  [8-20] 16  BP: ()/(58-92) 100/60  SpO2:  [89 %-95 %] 94 %     R: Plan for Chest tube or thoracentesis for left lung. Pain regimen needed, continue with POC. Notify primary team with changes.

## 2018-12-27 NOTE — PLAN OF CARE
Neuro: A&Ox4.   Cardiac: SR/bradycardic intermittently, with rates 56-70's. VSS, -114. Bedside ECHO done this morning.  Respiratory: Sating >90% on 4L nasal cannula. Plans for thoracentesis in IR.  GI/: Adequate urine output via urinal. BM X0.  Diet/appetite: Tolerating NPO diet;in preparation for IR.  Activity:  Assist of 1-2, up to chair and in halls. YEIMY NWB arm, orders updated by Ortho.  Pain: Oxycodone orders changed to Q4h PRN, given x1.   Skin/LDA's: No new deficits noted. R PIV infusing TKO.      Plan: Continue with POC. Notify primary team with changes.
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
## 2018-12-27 NOTE — OR NURSING
Post x-ray anesthesia at bedside.  Dr. Bradshaw contacted medicine team.  CT obtained.  Patient remained stable but requiring increased oxygen.  1000cc fluid bolus given during/after CT scan. Currently on 7L simple face mask.  [facility] transport set up.  Patient transferring to [facility].

## 2018-12-27 NOTE — PROGRESS NOTES
Social Work Services Progress Note    Hospital Day: 1  Date of Initial Social Work Evaluation:  12/13/18 during previous hospitalization  [facility]  [facility], Chart Review    Data:  SW involved for potential return to TCU. [facility] SW spoke with Admissions staff and Pt is not on a bedhold. New referral will need to be faxed for review if Pt would like to return at discharge. PT/OT evals are pending.     Intervention:  Discharge planning    Assessment:  SW will f/u with Pt to confirm if his [facility]; awaiting PT/OT evals.    Plan:    Anticipated Disposition:  TBD    Barriers to d/c plan:  Medical stability    Follow Up:  SW to continue to follow and assist as needed.    LINDA Barbour, LGSW  6B Intermediate Care Unit   Phone: [phone]  Pager: [phone]

## 2018-12-27 NOTE — CONSULTS
Patient is on IR schedule 12/27/2018 for a left thoracentesis .   Labs WNL for procedure.   No NPO required.   Consent will be done prior to procedure.    Please [place] the [place] [phone] for estimated time of procedure.       Magalie Antonio IR RPA  [phone] Call pager  [phone] pager

## 2018-12-28 ENCOUNTER — APPOINTMENT (OUTPATIENT)
Dept: GENERAL RADIOLOGY | Facility: CLINIC | Age: 53
End: 2018-12-28
Attending: INTERNAL MEDICINE
Payer: COMMERCIAL

## 2018-12-28 ENCOUNTER — APPOINTMENT (OUTPATIENT)
Dept: OCCUPATIONAL THERAPY | Facility: CLINIC | Age: 53
End: 2018-12-28
Attending: ORTHOPAEDIC SURGERY
Payer: COMMERCIAL

## 2018-12-28 LAB
ERYTHROCYTE [DISTWIDTH] IN BLOOD BY AUTOMATED COUNT: 13.2 % (ref 10–15)
HCT VFR BLD AUTO: 42 % (ref 40–53)
HGB BLD-MCNC: 13.6 G/DL (ref 13.3–17.7)
MCH RBC QN AUTO: 31.1 PG (ref 26.5–33)
MCHC RBC AUTO-ENTMCNC: 32.4 G/DL (ref 31.5–36.5)
MCV RBC AUTO: 96 FL (ref 78–100)
PLATELET # BLD AUTO: 287 10E9/L (ref 150–450)
RBC # BLD AUTO: 4.37 10E12/L (ref 4.4–5.9)
WBC # BLD AUTO: 11.5 10E9/L (ref 4–11)

## 2018-12-28 PROCEDURE — 97535 SELF CARE MNGMENT TRAINING: CPT | Mod: GO | Performed by: OCCUPATIONAL THERAPIST

## 2018-12-28 PROCEDURE — 25000132 ZZH RX MED GY IP 250 OP 250 PS 637: Performed by: STUDENT IN AN ORGANIZED HEALTH CARE EDUCATION/TRAINING PROGRAM

## 2018-12-28 PROCEDURE — 40000133 ZZH STATISTIC OT WARD VISIT: Performed by: OCCUPATIONAL THERAPIST

## 2018-12-28 PROCEDURE — 12000006 ZZH R&B IMCU INTERMEDIATE UMMC

## 2018-12-28 PROCEDURE — 25000132 ZZH RX MED GY IP 250 OP 250 PS 637: Performed by: ORTHOPAEDIC SURGERY

## 2018-12-28 PROCEDURE — 97110 THERAPEUTIC EXERCISES: CPT | Mod: GO | Performed by: OCCUPATIONAL THERAPIST

## 2018-12-28 PROCEDURE — 85027 COMPLETE CBC AUTOMATED: CPT | Performed by: INTERNAL MEDICINE

## 2018-12-28 PROCEDURE — 25000128 H RX IP 250 OP 636: Performed by: INTERNAL MEDICINE

## 2018-12-28 PROCEDURE — 71046 X-RAY EXAM CHEST 2 VIEWS: CPT

## 2018-12-28 PROCEDURE — 97165 OT EVAL LOW COMPLEX 30 MIN: CPT | Mod: GO | Performed by: OCCUPATIONAL THERAPIST

## 2018-12-28 PROCEDURE — 25000132 ZZH RX MED GY IP 250 OP 250 PS 637: Performed by: INTERNAL MEDICINE

## 2018-12-28 PROCEDURE — 99232 SBSQ HOSP IP/OBS MODERATE 35: CPT | Mod: GC | Performed by: INTERNAL MEDICINE

## 2018-12-28 PROCEDURE — 36415 COLL VENOUS BLD VENIPUNCTURE: CPT | Performed by: INTERNAL MEDICINE

## 2018-12-28 RX ORDER — KETOROLAC TROMETHAMINE 15 MG/ML
15 INJECTION, SOLUTION INTRAMUSCULAR; INTRAVENOUS EVERY 6 HOURS PRN
Status: DISPENSED | OUTPATIENT
Start: 2018-12-28 | End: 2018-12-30

## 2018-12-28 RX ORDER — LIDOCAINE 4 G/G
1 PATCH TOPICAL
Status: DISCONTINUED | OUTPATIENT
Start: 2018-12-28 | End: 2018-12-28

## 2018-12-28 RX ORDER — LIDOCAINE 4 G/G
3 PATCH TOPICAL
Status: DISCONTINUED | OUTPATIENT
Start: 2018-12-28 | End: 2018-12-30 | Stop reason: HOSPADM

## 2018-12-28 RX ADMIN — Medication 200 MG: at 08:17

## 2018-12-28 RX ADMIN — GABAPENTIN 600 MG: 300 CAPSULE ORAL at 19:53

## 2018-12-28 RX ADMIN — ACETAMINOPHEN 975 MG: 325 TABLET, FILM COATED ORAL at 08:16

## 2018-12-28 RX ADMIN — LIDOCAINE 3 PATCH: 560 PATCH PERCUTANEOUS; TOPICAL; TRANSDERMAL at 19:54

## 2018-12-28 RX ADMIN — GABAPENTIN 600 MG: 300 CAPSULE ORAL at 08:17

## 2018-12-28 RX ADMIN — ACETAMINOPHEN 975 MG: 325 TABLET, FILM COATED ORAL at 18:13

## 2018-12-28 RX ADMIN — OXYCODONE HYDROCHLORIDE 10 MG: 5 TABLET ORAL at 16:15

## 2018-12-28 RX ADMIN — KETOROLAC TROMETHAMINE 15 MG: 15 INJECTION, SOLUTION INTRAMUSCULAR; INTRAVENOUS at 22:02

## 2018-12-28 RX ADMIN — OXYCODONE HYDROCHLORIDE 10 MG: 5 TABLET ORAL at 04:13

## 2018-12-28 RX ADMIN — GABAPENTIN 600 MG: 300 CAPSULE ORAL at 13:33

## 2018-12-28 RX ADMIN — OXYCODONE HYDROCHLORIDE 10 MG: 5 TABLET ORAL at 00:08

## 2018-12-28 RX ADMIN — ACETAMINOPHEN 975 MG: 325 TABLET, FILM COATED ORAL at 12:14

## 2018-12-28 RX ADMIN — OXYCODONE HYDROCHLORIDE 10 MG: 5 TABLET ORAL at 08:16

## 2018-12-28 RX ADMIN — OXYCODONE HYDROCHLORIDE 10 MG: 5 TABLET ORAL at 12:14

## 2018-12-28 RX ADMIN — POLYETHYLENE GLYCOL 3350 17 G: 17 POWDER, FOR SOLUTION ORAL at 16:08

## 2018-12-28 RX ADMIN — KETOROLAC TROMETHAMINE 15 MG: 15 INJECTION, SOLUTION INTRAMUSCULAR; INTRAVENOUS at 15:59

## 2018-12-28 RX ADMIN — OXYCODONE HYDROCHLORIDE 10 MG: 5 TABLET ORAL at 20:15

## 2018-12-28 ASSESSMENT — ACTIVITIES OF DAILY LIVING (ADL)
ADLS_ACUITY_SCORE: 18
ADLS_ACUITY_SCORE: 12
ADLS_ACUITY_SCORE: 12
ADLS_ACUITY_SCORE: 18
ADLS_ACUITY_SCORE: 12
ADLS_ACUITY_SCORE: 18

## 2018-12-28 ASSESSMENT — MIFFLIN-ST. JEOR: SCORE: 1684.13

## 2018-12-28 ASSESSMENT — COLUMBIA-SUICIDE SEVERITY RATING SCALE - C-SSRS
6. HAVE YOU EVER DONE ANYTHING, STARTED TO DO ANYTHING, OR PREPARED TO DO ANYTHING TO END YOUR LIFE?: NO
2. HAVE YOU ACTUALLY HAD ANY THOUGHTS OF KILLING YOURSELF IN THE PAST MONTH?: NO
1. IN THE PAST MONTH, HAVE YOU WISHED YOU WERE DEAD OR WISHED YOU COULD GO TO SLEEP AND NOT WAKE UP?: NO

## 2018-12-28 NOTE — PROGRESS NOTES
Patient Name: Quang Carolina  Medical Record Number: [id number]  Today's Date: 12/27/2018    Procedure: Left thoracentesis  Proceduralist: Dr Atkins, Dr Seals    Total sedation time: no sedation given      Procedure start time: 1755  Puncture time: 1757  Procedure end time: 1810    Report given to: BRTITON Malave 6B      Other notes: Pt in IR room 7 from 6B. Pt  ID and procedure verified and consent signed. Pt positioned sitting upright, prepped and monitored per protocol.   Puncture site, left mid-back is clean, dry, covered with sterile dressing.   Specimens, pleural fluid collected, labeled and sent to lab. Drained 760 ml serosanguinous fluid.  Pt tolerated procedure well.  Pt transferred back to 6B.

## 2018-12-28 NOTE — PROGRESS NOTES
Social Work Services Progress Note    Hospital Day: 2  Date of Initial Social Work Evaluation:  12/13/18 during previous hospitalization - please see for details  Collaborated with:  Patient, TCU, Chart Review    Data:  SW following for return to TCU when medically stable. Pt was admitted from [facility] TCU. SW spoke with Admissions staff yesterday and Pt is not on a bedhold. New referral will need to be faxed for review if Pt would like to return at discharge.     SW met with Pt at bedside today to introduce self, SW role and discuss discharge planning. SW confirmed that Pt's plan is to return to [facility] TCU when medically stable for discharge. He said that the medical team told him he may be stable as early as Monday. Per pt's request, SW faxed updated referral to [facility] (Main: [phone], Admissions: [phone], Fax: [phone]) today for review. SW spoke with Admissions and confirmed they will review referral.    SW confirmed with Pt that he would like a ride arranged at discharge. Per chart review, Pt used [employer] (Ph: [phone]) W/C ride upon discharge last time; will s/u again at discharge.     Intervention:  Discharge planning    Assessment:  Pt in agreement with returning to TCU at d/c. Pt acknowledges that he is not safe to return home at this time and needs further rehab therapies. Pt pleasant, cooperative and open to SW visit.    Plan:    Anticipated Disposition:  Facility:  [facility] reviewing Pt's referral    Barriers to d/c plan:  Medical stability, placement    Follow Up:  SW to continue to follow and assist with discharge plan.    LINDA Barbour, LGSW  6B Intermediate Care Unit   Phone: [phone]  Pager: [phone]
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
## 2018-12-28 NOTE — PLAN OF CARE
Discharge Planner OT   Patient plan for discharge: TCU  Current status: Pt required Mod A for bed mobility and Max A for LE dressing. Pt tolerated no standing ADLs and c/o dizziness when upright. All VSS throughout.   Barriers to return to prior living situation: dizziness, activity tolerance L UE NWB, pain  Recommendations for discharge: TCU  Rationale for recommendations: to increase ADL I and tolerance       Entered by: Chuy Cotton 12/28/2018 11:08 AM

## 2018-12-28 NOTE — PROGRESS NOTES
Interventional Radiology Brief Post Procedure Note    Procedure: IR THORACENTESIS    Proceduralist: Nery Atkins MD    Assistant: Da Seals MD and None    Time Out: Prior to the start of the procedure and with procedural staff participation, I verbally confirmed the patient s identity using two indicators, relevant allergies, that the procedure was appropriate and matched the consent or emergent situation, and that the correct equipment/implants were available. Immediately prior to starting the procedure I conducted the Time Out with the procedural staff and re-confirmed the patient s name, procedure, and site/side. (The Joint Commission universal protocol was followed.)  Yes    Medications   Medication Event Details Admin User Admin Time   lidocaine (PF) (XYLOCAINE) 1 % injection 1-30 mL Medication Given by Other Dose: 10 mL; Route: Subcutaneous Valeria Ellis RN 12/27/2018  6:13 PM       Sedation: None. Local Anesthestic used    Findings: A total of 760 ml bloody fluid was aspirated.    Estimated Blood Loss: None    Fluoroscopy Time:  minute(s)    SPECIMENS: Fluid and/or tissue for laboratory analysis    Complications: 1. None     Condition: Stable    Plan: Bedrest for an hour.    Comments: See dictated procedure note for full details.    Da Seals MD

## 2018-12-28 NOTE — PLAN OF CARE
"/83 (BP Location: Right arm)   Pulse 83   Temp 98.2  F (36.8  C) (Oral)   Resp 18   Ht 1.803 m (5' 11\")   Wt 80.7 kg (177 lb 14.6 oz)   SpO2 95%   BMI 24.81 kg/m      Neuro: A&Ox4. Good CMS in L extremity.   Cardiac: SR. VSS.            Respiratory: Sating >94% on 4L, writer weaned pt down to 2L NC and pt is tolerating lower O2. LS clear. IS to 3000.   GI/: Adequate urine output into urinal independently, no BM on overnights.   Diet/appetite: Tolerating regular diet. Eating well.  Activity:  Assist of 1, up to chair and to hallway.   Pain: pt continues to require oxy q4h consistently.    Skin: Thoracentesis site on pt's back covered with Tegaderm and has small serosanguinous drainage. Clavicle dressing c/d/I, old chest tube site scabbed.   LDA's: PIV x1 SL,      Plan: Monitor pain and notify team if additional coverage needed. Continue with PRN Oxycodone.             "
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
## 2018-12-28 NOTE — PLAN OF CARE
Pt A&Ox4. VSS pt weaned to RA-2L. IS at bedside, pt getting up to 3000ml. Pain not controlled well this am. Lido patches and toradol ordered. CXR performed. Good appetite. Pt up to chair x1. No acute issues this shift. Will cont to monitor.

## 2018-12-28 NOTE — PLAN OF CARE
Neuro: A&Ox4. Good CMS in L extremity.   Cardiac: SR. VSS. B/P: 111/71, T: 98.6, P: 83, R: 18   Respiratory: Sating 94% on 4L nc. LS clear. IS to 2500.   GI/: Adequate urine output into urinal independently, no BM this evening.   Diet/appetite: Tolerating regular diet. Eating well.  Activity:  Assist of 1, up to chair and to hallway.   Pain: After thoracentesis, pt struggled to get pain under control. C/o sharp pain with inhalation initially, but then changed to constant ache in L clavicle. Order for IV Dilaudid received and 0.5mg one time dose given with minimal relief.  Skin: Thoracentesis site on pt's back covered with Tegaderm and has small serosanguinous drainage. Clavicle dressing c/d/I, old chest tube site scabbed.   LDA's: PIV x1 SL, Nasal cannula 4L    Plan: Monitor pain and notify team if additional coverage needed. Continue with PRN Oxycodone.

## 2018-12-28 NOTE — PLAN OF CARE
PT / 6B - Defer.  PT orders received and acknowledged.  Per chart review and discussion with OT, patient upright activity limited most by dizziness therefore unable to progress OOB functional mobility. Patient with safe discharge plan to return to TCU.  PT orders completed, thank you for the consult.
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
## 2018-12-29 ENCOUNTER — APPOINTMENT (OUTPATIENT)
Dept: OCCUPATIONAL THERAPY | Facility: CLINIC | Age: 53
End: 2018-12-29
Attending: ORTHOPAEDIC SURGERY
Payer: COMMERCIAL

## 2018-12-29 ENCOUNTER — APPOINTMENT (OUTPATIENT)
Dept: GENERAL RADIOLOGY | Facility: CLINIC | Age: 53
End: 2018-12-29
Attending: INTERNAL MEDICINE
Payer: COMMERCIAL

## 2018-12-29 LAB
ERYTHROCYTE [DISTWIDTH] IN BLOOD BY AUTOMATED COUNT: 13.1 % (ref 10–15)
HCT VFR BLD AUTO: 41.1 % (ref 40–53)
HCT VFR BLD AUTO: 41.4 % (ref 40–53)
HGB BLD-MCNC: 13.3 G/DL (ref 13.3–17.7)
MCH RBC QN AUTO: 31.1 PG (ref 26.5–33)
MCHC RBC AUTO-ENTMCNC: 32.1 G/DL (ref 31.5–36.5)
MCV RBC AUTO: 97 FL (ref 78–100)
PLATELET # BLD AUTO: 273 10E9/L (ref 150–450)
RBC # BLD AUTO: 4.27 10E12/L (ref 4.4–5.9)
WBC # BLD AUTO: 7.2 10E9/L (ref 4–11)

## 2018-12-29 PROCEDURE — 36415 COLL VENOUS BLD VENIPUNCTURE: CPT | Performed by: INTERNAL MEDICINE

## 2018-12-29 PROCEDURE — 25000132 ZZH RX MED GY IP 250 OP 250 PS 637: Performed by: INTERNAL MEDICINE

## 2018-12-29 PROCEDURE — 97110 THERAPEUTIC EXERCISES: CPT | Mod: GO | Performed by: OCCUPATIONAL THERAPIST

## 2018-12-29 PROCEDURE — 99231 SBSQ HOSP IP/OBS SF/LOW 25: CPT | Mod: GC | Performed by: INTERNAL MEDICINE

## 2018-12-29 PROCEDURE — 25000132 ZZH RX MED GY IP 250 OP 250 PS 637: Performed by: ORTHOPAEDIC SURGERY

## 2018-12-29 PROCEDURE — 40000133 ZZH STATISTIC OT WARD VISIT: Performed by: OCCUPATIONAL THERAPIST

## 2018-12-29 PROCEDURE — 25000132 ZZH RX MED GY IP 250 OP 250 PS 637: Performed by: STUDENT IN AN ORGANIZED HEALTH CARE EDUCATION/TRAINING PROGRAM

## 2018-12-29 PROCEDURE — 12000008 ZZH R&B INTERMEDIATE UMMC

## 2018-12-29 PROCEDURE — 85014 HEMATOCRIT: CPT | Performed by: STUDENT IN AN ORGANIZED HEALTH CARE EDUCATION/TRAINING PROGRAM

## 2018-12-29 PROCEDURE — 97140 MANUAL THERAPY 1/> REGIONS: CPT | Mod: GO | Performed by: OCCUPATIONAL THERAPIST

## 2018-12-29 PROCEDURE — 25000128 H RX IP 250 OP 636: Performed by: INTERNAL MEDICINE

## 2018-12-29 PROCEDURE — 97535 SELF CARE MNGMENT TRAINING: CPT | Mod: GO | Performed by: OCCUPATIONAL THERAPIST

## 2018-12-29 PROCEDURE — 85027 COMPLETE CBC AUTOMATED: CPT | Performed by: INTERNAL MEDICINE

## 2018-12-29 PROCEDURE — 71046 X-RAY EXAM CHEST 2 VIEWS: CPT

## 2018-12-29 PROCEDURE — 97530 THERAPEUTIC ACTIVITIES: CPT | Mod: GO | Performed by: OCCUPATIONAL THERAPIST

## 2018-12-29 RX ORDER — KETOROLAC TROMETHAMINE 15 MG/ML
15 INJECTION, SOLUTION INTRAMUSCULAR; INTRAVENOUS EVERY 6 HOURS PRN
Status: CANCELLED | OUTPATIENT
Start: 2018-12-29

## 2018-12-29 RX ADMIN — GABAPENTIN 600 MG: 300 CAPSULE ORAL at 08:16

## 2018-12-29 RX ADMIN — ACETAMINOPHEN 975 MG: 325 TABLET, FILM COATED ORAL at 18:21

## 2018-12-29 RX ADMIN — OXYCODONE HYDROCHLORIDE 10 MG: 5 TABLET ORAL at 00:16

## 2018-12-29 RX ADMIN — Medication 200 MG: at 08:16

## 2018-12-29 RX ADMIN — OXYCODONE HYDROCHLORIDE 10 MG: 5 TABLET ORAL at 16:45

## 2018-12-29 RX ADMIN — GABAPENTIN 600 MG: 300 CAPSULE ORAL at 19:57

## 2018-12-29 RX ADMIN — OXYCODONE HYDROCHLORIDE 10 MG: 5 TABLET ORAL at 08:19

## 2018-12-29 RX ADMIN — ACETAMINOPHEN 975 MG: 325 TABLET, FILM COATED ORAL at 11:55

## 2018-12-29 RX ADMIN — OXYCODONE HYDROCHLORIDE 10 MG: 5 TABLET ORAL at 12:37

## 2018-12-29 RX ADMIN — KETOROLAC TROMETHAMINE 15 MG: 15 INJECTION, SOLUTION INTRAMUSCULAR; INTRAVENOUS at 11:55

## 2018-12-29 RX ADMIN — LIDOCAINE 3 PATCH: 560 PATCH PERCUTANEOUS; TOPICAL; TRANSDERMAL at 19:57

## 2018-12-29 RX ADMIN — OXYCODONE HYDROCHLORIDE 10 MG: 5 TABLET ORAL at 04:23

## 2018-12-29 RX ADMIN — POLYETHYLENE GLYCOL 3350 17 G: 17 POWDER, FOR SOLUTION ORAL at 08:19

## 2018-12-29 RX ADMIN — ACETAMINOPHEN 975 MG: 325 TABLET, FILM COATED ORAL at 08:16

## 2018-12-29 RX ADMIN — KETOROLAC TROMETHAMINE 15 MG: 15 INJECTION, SOLUTION INTRAMUSCULAR; INTRAVENOUS at 18:21

## 2018-12-29 RX ADMIN — GABAPENTIN 600 MG: 300 CAPSULE ORAL at 15:01

## 2018-12-29 RX ADMIN — KETOROLAC TROMETHAMINE 15 MG: 15 INJECTION, SOLUTION INTRAMUSCULAR; INTRAVENOUS at 05:46

## 2018-12-29 RX ADMIN — OXYCODONE HYDROCHLORIDE 10 MG: 5 TABLET ORAL at 20:52

## 2018-12-29 ASSESSMENT — ACTIVITIES OF DAILY LIVING (ADL)
ADLS_ACUITY_SCORE: 18

## 2018-12-29 NOTE — PLAN OF CARE
"Discharge Planner OT   Patient plan for discharge: assisted living while he has his LUE restrictions in place  Current status: OT educated pt extensively on not \"pushing through\" pain. Pt required extensive VC on not initiating shoulder movement and elevating shoulder throughout. Pt w/ clear correlation w/ L shoulder pain and muscle movement in L shoulder. OT educated pt on functional transfers and ADLS w/in precautions. Pt agreeable to education and training throughout.   Barriers to return to prior living situation: medical status  Recommendations for discharge: TCU  Rationale for recommendations: to increase ind in ADLS/IADLS       Entered by: Birseida Forrest 12/29/2018 1:28 PM       "
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
## 2018-12-29 NOTE — PLAN OF CARE
Transfer  Transferred to: 7B  Via: wheelchair  Reason for transfer:Pt no longer appropriate for 6B- improved patient condition  Family: Aware of transfer  Belongings: Packed and sent with pt  Chart: Delivered with pt to next unit  Medications: Meds sent to new unit with pt  Report given to: Jackie JARQUIN  Pt status: Stable

## 2018-12-29 NOTE — PLAN OF CARE
"Neuro: A&Ox4. Tingling to L hand, pt reports is not new and is unchanged/improved at times.   Cardiac: SB-SR, telemetry dc'd. Pt reports mild dizziness when he first stands, resolves quickly. /80 (BP Location: Right arm)   Pulse 52   Temp 97.6  F (36.4  C) (Oral)   Resp 16   Ht 1.803 m (5' 11\")   Wt 80.7 kg (177 lb 14.6 oz)   SpO2 93%   BMI 24.81 kg/m     Respiratory: Sating 92%+ on RA. Slight shortness of breath with activity. Clear/diminished lungs. L back thora site covered with transparent dressing, CDI. Pt using IS.  GI/: Adequate urine output, no BM this shift. No N/V.   Diet/appetite: Tolerating regular diet.  Activity:  SBA to ambulate. Moves independently in bed. NWB to LUE, arm in immobilizer, able to do ROM with elbow/wrist/fingers.   Pain: At acceptable level on current regimen. PRN oxycodone & PRN toradol given for L clavice/rib/back pain.   Skin: No new deficits noted. L clavicle Surgical incision CDI.   LDA's: R PIV saline locked.      Plan: Continue with POC. Notify primary team with changes. Pt has transfer orders, will go to 7B.            "
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
## 2018-12-29 NOTE — PLAN OF CARE
Vitals:    12/29/18 0815 12/29/18 0831 12/29/18 0900 12/29/18 1510   BP:  111/80  116/69   BP Location:  Right arm  Left arm   Pulse:   52 59   Resp:    16   Temp:  97.6  F (36.4  C)  96  F (35.6  C)   TempSrc:  Oral  Oral   SpO2: 93%   94%   Weight:       Height:         Pt transferred to  from  at 1510.     A&O x4. Afebrile, 90-94% RA, bradycardic. Dyspnea on exertion.  Oxycodone, Tylenol, and Toradol given for pain, effective. Denies nausea.  NWB LUE, immobilizer in place. CMS intact, + L radial pulse. Surgical dressing CDI.  Voiding without difficulty. +BS, +flatus. Up w/ SBA.  Continue plan of care.

## 2018-12-29 NOTE — PLAN OF CARE
Neuro: A&Ox4. Tingling to L hand, pt reports is not new and is unchanged/improved at times.   Cardiac: SB-SR, HR mid 40s-mid 60s. SP 100s/70s. VSS.   Respiratory: Sating 92%+ on 1L NC. Denies SOB. Clear/diminished lungs. L back thora site covered with transparent dressing, CDI.   GI/: Adequate urine output, BM x2 per pt this shift. No N/V.   Diet/appetite: Tolerating regular diet.  Activity:  SBA to ambulate. Moves independently in bed. NWB to LUE, arm in immobilizer, able to do ROM with elbow/wrist/fingers.   Pain: At acceptable level on current regimen. PRN oxycodone given 3, PRN toradol given x1 for L clavice/rib pain.   Skin: No new deficits noted. L clavicle Surgical incision CDI.   LDA's: R PIV saline locked.     Plan: Continue with POC. Notify primary team with changes.

## 2018-12-30 ENCOUNTER — APPOINTMENT (OUTPATIENT)
Dept: GENERAL RADIOLOGY | Facility: CLINIC | Age: 53
End: 2018-12-30
Attending: ORTHOPAEDIC SURGERY
Payer: COMMERCIAL

## 2018-12-30 ENCOUNTER — APPOINTMENT (OUTPATIENT)
Dept: GENERAL RADIOLOGY | Facility: CLINIC | Age: 53
End: 2018-12-30
Attending: INTERNAL MEDICINE
Payer: COMMERCIAL

## 2018-12-30 VITALS
TEMPERATURE: 97.5 F | RESPIRATION RATE: 16 BRPM | SYSTOLIC BLOOD PRESSURE: 125 MMHG | HEIGHT: 71 IN | WEIGHT: 177.91 LBS | OXYGEN SATURATION: 94 % | DIASTOLIC BLOOD PRESSURE: 82 MMHG | BODY MASS INDEX: 24.91 KG/M2 | HEART RATE: 63 BPM

## 2018-12-30 LAB
ANION GAP SERPL CALCULATED.3IONS-SCNC: 5 MMOL/L (ref 3–14)
BUN SERPL-MCNC: 22 MG/DL (ref 7–30)
CALCIUM SERPL-MCNC: 8.3 MG/DL (ref 8.5–10.1)
CHLORIDE SERPL-SCNC: 105 MMOL/L (ref 94–109)
CO2 SERPL-SCNC: 28 MMOL/L (ref 20–32)
CREAT SERPL-MCNC: 0.92 MG/DL (ref 0.66–1.25)
GFR SERPL CREATININE-BSD FRML MDRD: >90 ML/MIN/{1.73_M2}
GLUCOSE SERPL-MCNC: 86 MG/DL (ref 70–99)
POTASSIUM SERPL-SCNC: 4.5 MMOL/L (ref 3.4–5.3)
SODIUM SERPL-SCNC: 138 MMOL/L (ref 133–144)

## 2018-12-30 PROCEDURE — 99239 HOSP IP/OBS DSCHRG MGMT >30: CPT | Performed by: INTERNAL MEDICINE

## 2018-12-30 PROCEDURE — 36415 COLL VENOUS BLD VENIPUNCTURE: CPT | Performed by: INTERNAL MEDICINE

## 2018-12-30 PROCEDURE — 25000132 ZZH RX MED GY IP 250 OP 250 PS 637: Performed by: STUDENT IN AN ORGANIZED HEALTH CARE EDUCATION/TRAINING PROGRAM

## 2018-12-30 PROCEDURE — 73050 X-RAY EXAM OF SHOULDERS: CPT

## 2018-12-30 PROCEDURE — 71046 X-RAY EXAM CHEST 2 VIEWS: CPT

## 2018-12-30 PROCEDURE — 80048 BASIC METABOLIC PNL TOTAL CA: CPT | Performed by: INTERNAL MEDICINE

## 2018-12-30 PROCEDURE — 25000132 ZZH RX MED GY IP 250 OP 250 PS 637: Performed by: ORTHOPAEDIC SURGERY

## 2018-12-30 PROCEDURE — 25000128 H RX IP 250 OP 636: Performed by: INTERNAL MEDICINE

## 2018-12-30 RX ORDER — OXYCODONE HYDROCHLORIDE 5 MG/1
5-10 TABLET ORAL EVERY 4 HOURS PRN
Qty: 40 TABLET | Refills: 0 | Status: SHIPPED | DISCHARGE
Start: 2018-12-30 | End: 2019-03-07

## 2018-12-30 RX ORDER — IBUPROFEN 400 MG/1
400 TABLET, FILM COATED ORAL EVERY 6 HOURS PRN
Qty: 120 TABLET | Refills: 0 | DISCHARGE
Start: 2018-12-30 | End: 2019-03-07

## 2018-12-30 RX ADMIN — KETOROLAC TROMETHAMINE 15 MG: 15 INJECTION, SOLUTION INTRAMUSCULAR; INTRAVENOUS at 00:51

## 2018-12-30 RX ADMIN — ACETAMINOPHEN 975 MG: 325 TABLET, FILM COATED ORAL at 08:11

## 2018-12-30 RX ADMIN — OXYCODONE HYDROCHLORIDE 10 MG: 5 TABLET ORAL at 00:51

## 2018-12-30 RX ADMIN — ACETAMINOPHEN 975 MG: 325 TABLET, FILM COATED ORAL at 11:53

## 2018-12-30 RX ADMIN — OXYCODONE HYDROCHLORIDE 10 MG: 5 TABLET ORAL at 07:35

## 2018-12-30 RX ADMIN — Medication 200 MG: at 08:12

## 2018-12-30 RX ADMIN — GABAPENTIN 600 MG: 300 CAPSULE ORAL at 08:11

## 2018-12-30 RX ADMIN — OXYCODONE HYDROCHLORIDE 10 MG: 5 TABLET ORAL at 11:53

## 2018-12-30 ASSESSMENT — ACTIVITIES OF DAILY LIVING (ADL)
ADLS_ACUITY_SCORE: 18
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
## 2018-12-30 NOTE — PLAN OF CARE
"/69 (BP Location: Left arm)   Pulse 64   Temp 97.3  F (36.3  C) (Axillary)   Resp 16   Ht 1.803 m (5' 11\")   Wt 80.7 kg (177 lb 14.6 oz)   SpO2 93%   BMI 24.81 kg/m     Activity: NWB on LUE/sling on at all times. Transfer with assist X1. Currently resting well.  Neuros: Alert and oriented X4. Tingling feeling at the left upper shoulder.   Respiratory: Saturation at 98%RA.  GI/: Voiding adequately. Passing flatus no stool.   Diet: Regular diet.   Lines: Right upper extremities PIV.   Incisions/Drains: No drains.   Pain/nausea: 3 Lidocaine patch/on the left shoulder and back, oxycodone and Toradol. Flexeril or robaxin per patient request/notified on-call resident.   New changes this shift: No new changes.  Plan: Continue with plan of care.      "
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
## 2018-12-30 NOTE — PLAN OF CARE
"/82 (BP Location: Left arm)   Pulse 63   Temp 97.5  F (36.4  C) (Axillary)   Resp 16   Ht 1.803 m (5' 11\")   Wt 80.7 kg (177 lb 14.6 oz)   SpO2 94%   BMI 24.81 kg/m      Neuro: WDL, pt is A&Ox4  Cardiac: WDL, AVSS  Respiratory: WDL, pt is on RA, denies any SOB or difficulty breathing  GI/: Pt is up voiding spontaneously and adequately, has +BS, +flatus, no BM today but had x1 yesterday  Skin: Pt has a incision on his L clavicle, dressing is C/D/I, has an old tegadrem on L back from [facility] on the 27th.  Pain: Pt c/o of L shoulder pain, pain has been managed w/ PRN oxycodone Q4hrs  LDA: PIV removed before discharge  Activity: Pt is NWB on LUE/sling on at all times, up IND in room, needs some assistance w/ dressing and cares  Diet/Appetite: Pt is on a reg diet, tolerating well, denies nausea, good oral intake  Plan: Discharged back to his previous TCU placement prior to admission, mike came to  pt at 1345, AVS briefly reviewed w/ pt, packet and paperwork sent and faxed to care facility. No further concerns        "

## 2019-01-01 LAB
BACTERIA SPEC CULT: NO GROWTH
SPECIMEN SOURCE: NORMAL

## 2019-01-02 ENCOUNTER — TRANSFERRED RECORDS (OUTPATIENT)
Dept: HEALTH INFORMATION MANAGEMENT | Facility: CLINIC | Age: 54
End: 2019-01-02
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
## 2019-01-07 ENCOUNTER — TRANSFERRED RECORDS (OUTPATIENT)
Dept: HEALTH INFORMATION MANAGEMENT | Facility: CLINIC | Age: 54
End: 2019-01-07

## 2019-01-10 ENCOUNTER — ANCILLARY PROCEDURE (OUTPATIENT)
Dept: GENERAL RADIOLOGY | Facility: CLINIC | Age: 54
End: 2019-01-10
Payer: COMMERCIAL

## 2019-01-10 ENCOUNTER — OFFICE VISIT (OUTPATIENT)
Dept: ORTHOPEDICS | Facility: CLINIC | Age: 54
End: 2019-01-10
Payer: COMMERCIAL

## 2019-01-10 VITALS — DIASTOLIC BLOOD PRESSURE: 68 MMHG | HEART RATE: 74 BPM | OXYGEN SATURATION: 96 % | SYSTOLIC BLOOD PRESSURE: 119 MMHG

## 2019-01-10 DIAGNOSIS — S42.002A CLOSED FRACTURE OF LEFT CLAVICLE: ICD-10-CM

## 2019-01-10 DIAGNOSIS — S42.022D CLOSED DISPLACED FRACTURE OF SHAFT OF LEFT CLAVICLE WITH ROUTINE HEALING, SUBSEQUENT ENCOUNTER: Primary | ICD-10-CM

## 2019-01-10 PROCEDURE — 73000 X-RAY EXAM OF COLLAR BONE: CPT | Mod: LT | Performed by: RADIOLOGY

## 2019-01-10 PROCEDURE — 99024 POSTOP FOLLOW-UP VISIT: CPT | Performed by: ORTHOPAEDIC SURGERY

## 2019-01-10 ASSESSMENT — PAIN SCALES - GENERAL: PAINLEVEL: SEVERE PAIN (7)

## 2019-01-10 NOTE — LETTER
1/10/2019         RE: Quang Carolina  [address]  [place] [address]        Dear Colleague,    Thank you for referring your patient, Quang Carolina, to the [facility]. Please see a copy of my visit note below.    DIAGNOSIS:   1. Left, comminuted midshaft clavicle fracture  2. Multiple left rib fractures  3. 1.5 weeks s/p chest tube removal for pneumothorax    PROCEDURES:  1. 12/26/18    HISTORY:  52yo male ~ 2 weeks s/p ORIF left clavicle and was unfortunately admitted post op due to acute hypoxemic respiratory failure and findings of pleural effusion. He had thoracentesis on 12/27 and the hypoxemia resolved. He is breathing fine without problems. With regard to his shoulder. Pain is controlled on PO meds. He denies redness or drainage from the incision. He would like to discharge from the SNF and requests we fill out his paperwork.     EXAM:     General: Awake, Alert, and oriented. Articulates and communicates with a normal affect     Left Upper Extremity:    Incisions well healed without evidence of infection    Normal post-operative effusion and ecchymosis    Range of motion and stability exam not performed    Neurovascularly intact    ASSESSMENT:  1. 52yo male ~2 weeks s/p ORIF left clavicle fracture and resolving pleural effusion.     PLAN:   -Continue hand, wrist, elbow ROM as tolerated.   -continue sling full time except for hygiene, but ok to remove abduction pillow.   -Follow in 4 weeks (6 weeks post op) at [facility] and we will discontinue the sling at that point.         Floridalma Abebe PGY-5  Orthopedic Surgery    I have personally examined this patient and have reviewed the clinical presentation and progress note with the resident.  I agree with the treatment plan as outlined.  The plan was formulated with the resident on the day of the resident's note.         Again, thank you for allowing me to participate in the care of your patient.         Sincerely,        Daya Leach MD

## 2019-01-10 NOTE — NURSING NOTE
Quang Caorlina's chief complaint for this visit includes:  Chief Complaint   Patient presents with     Left Shoulder - Surgical Followup      LEFT ORIF. DOS: 12/26/2018     PCP: [facility]    Referring Provider:  No referring provider defined for this encounter.    /68 (BP Location: Right arm, Patient Position: Sitting, Cuff Size: Adult Large)   Pulse 74   SpO2 96%   Severe Pain (7)     Do you need any medication refills at today's visit? no

## 2019-01-10 NOTE — PROGRESS NOTES
DIAGNOSIS:   1. Left, comminuted midshaft clavicle fracture  2. Multiple left rib fractures  3. 1.5 weeks s/p chest tube removal for pneumothorax    PROCEDURES:  1. 12/26/18    HISTORY:  52yo male ~ 2 weeks s/p ORIF left clavicle and was unfortunately admitted post op due to acute hypoxemic respiratory failure and findings of pleural effusion. He had thoracentesis on 12/27 and the hypoxemia resolved. He is breathing fine without problems. With regard to his shoulder. Pain is controlled on PO meds. He denies redness or drainage from the incision. He would like to discharge from the SNF and requests we fill out his paperwork.     EXAM:     General: Awake, Alert, and oriented. Articulates and communicates with a normal affect     Left Upper Extremity:    Incisions well healed without evidence of infection    Normal post-operative effusion and ecchymosis    Range of motion and stability exam not performed    Neurovascularly intact    ASSESSMENT:  1. 52yo male ~2 weeks s/p ORIF left clavicle fracture and resolving pleural effusion.     PLAN:   -Continue hand, wrist, elbow ROM as tolerated.   -continue sling full time except for hygiene, but ok to remove abduction pillow.   -Follow in 4 weeks (6 weeks post op) at [facility] and we will discontinue the sling at that point.         Floridalma Abebe PGY-5  Orthopedic Surgery    I have personally examined this patient and have reviewed the clinical presentation and progress note with the resident.  I agree with the treatment plan as outlined.  The plan was formulated with the resident on the day of the resident's note.

## 2019-01-14 DIAGNOSIS — J18.9 PNEUMONIA: Primary | ICD-10-CM

## 2019-01-31 DIAGNOSIS — S42.002A CLOSED FRACTURE OF LEFT CLAVICLE: Primary | ICD-10-CM

## 2019-02-06 ENCOUNTER — ANCILLARY PROCEDURE (OUTPATIENT)
Dept: GENERAL RADIOLOGY | Facility: CLINIC | Age: 54
End: 2019-02-06
Payer: COMMERCIAL

## 2019-02-06 ENCOUNTER — OFFICE VISIT (OUTPATIENT)
Dept: ORTHOPEDICS | Facility: CLINIC | Age: 54
End: 2019-02-06
Payer: COMMERCIAL

## 2019-02-06 DIAGNOSIS — S42.022D CLOSED DISPLACED FRACTURE OF SHAFT OF LEFT CLAVICLE WITH ROUTINE HEALING, SUBSEQUENT ENCOUNTER: Primary | ICD-10-CM

## 2019-02-06 DIAGNOSIS — S42.002A CLOSED FRACTURE OF LEFT CLAVICLE: ICD-10-CM

## 2019-02-06 RX ORDER — VARENICLINE TARTRATE 1 MG/1
TABLET, FILM COATED ORAL
COMMUNITY
Start: 2019-03-06 | End: 2019-03-07

## 2019-02-06 NOTE — PROGRESS NOTES
CHIEF CONCERN: Status post ORIF left comminuted midshaft clavicle fracture  DATE OF SURGERY: 12/26/18    HISTORY:  52yo male ~ 6 weeks s/p ORIF left clavicle and was unfortunately admitted post op due to acute hypoxemic respiratory failure and findings of pleural effusion. He had thoracentesis on 12/27 and the hypoxemia resolved. He is breathing fine without problems.     -at 2 weeks abduction pillow was discontinued, and he was kept in a splint    Interval improvement in pain. No numbness or tingling. Continuing to smoke, but attempting to start chantix today.      EXAM:     General: Awake, Alert, and oriented. Articulates and communicates with a normal affect     Left Upper Extremity: AROM of elbow and wrist without pain. AROM of shoulder flexion to 150 degrees abduction of 110 degrees with minimal pain. No numbness or tingling.     Imaging:   Clavicle x-rays (today):   Interval improvement in callus formation of clavicle fracture.     ASSESSMENT:  1. 52yo male ~6 weeks s/p ORIF left clavicle fracture and resolving pleural effusion.     PLAN:   -f/u in 6 weeks with repeat x-rays of the clavicle  -Told to not return to work until f/u in 6 weeks when he will likely be released to full activities     Kamaljit MD [place]   Orthopedic Surgery    I have personally examined this patient and have reviewed the clinical presentation and progress note with the resident.  I agree with the treatment plan as outlined.  The plan was formulated with the resident on the day of the resident's note.

## 2019-02-06 NOTE — LETTER
2/6/2019       RE: Quang Carolina  [address]  [place] [address]     Dear Colleague,    Thank you for referring your patient, Quang Carolina, [facility]. Please see a copy of my visit note below.    CHIEF CONCERN: Status post ORIF left comminuted midshaft clavicle fracture  DATE OF SURGERY: 12/26/18    HISTORY:  54yo male ~ 6 weeks s/p ORIF left clavicle and was unfortunately admitted post op due to acute hypoxemic respiratory failure and findings of pleural effusion. He had thoracentesis on 12/27 and the hypoxemia resolved. He is breathing fine without problems.     -at 2 weeks abduction pillow was discontinued, and he was kept in a splint    Interval improvement in pain. No numbness or tingling. Continuing to smoke, but attempting to start chantix today.      EXAM:     General: Awake, Alert, and oriented. Articulates and communicates with a normal affect     Left Upper Extremity: AROM of elbow and wrist without pain. AROM of shoulder flexion to 150 degrees abduction of 110 degrees with minimal pain. No numbness or tingling.     Imaging:   Clavicle x-rays (today):   Interval improvement in callus formation of clavicle fracture.     ASSESSMENT:  1. 54yo male ~6 weeks s/p ORIF left clavicle fracture and resolving pleural effusion.     PLAN:   -f/u in 6 weeks with repeat x-rays of the clavicle  -Told to not return to work until f/u in 6 weeks when he will likely be released to full activities     Kamaljit MD [place]   Orthopedic Surgery    I have personally examined this patient and have reviewed the clinical presentation and progress note with the resident.  I agree with the treatment plan as outlined.  The plan was formulated with the resident on the day of the resident's note.     Again, thank you for allowing me to participate in the care of your patient.      Sincerely,    Daya Leach MD

## 2019-02-06 NOTE — LETTER
Quang Carolina     1965  Encounter date/time:  02/06/19   [id number]    Report of Workability    Physician:  Daya Leach MD    Diagnosis:  Left shoulder injury    Limitations:  No work with left arm. No lift/push/pull/carry with left arm.    Return to work status: Unable to return to work until next re-evaluation.    Follow-Up:   Return to clinic in 6 weeks.
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
## 2019-02-27 NOTE — TELEPHONE ENCOUNTER
RECORDS RECEIVED FROM: Internal/ External   DATE RECEIVED: 3.7.19   NOTES STATUS DETAILS   OFFICE NOTE from referring provider Internal 12.27.18   OFFICE NOTE from other specialist N/A    DISCHARGE SUMMARY from hospital Internal 12.8.18-12.14.18   DISCHARGE REPORT from the ER N/A    OPERATIVE REPORT N/A    MEDICATION LIST Internal    IMAGING  (NEED IMAGES AND REPORTS)     CT SCAN Internal 12.26.18  12.11.18  More images in PACS   CHEST XRAY (CXR) Internal/External 1.2.19  12.30.18  12.29.18  12.28.18  12.26.18  12.21.18  12.12.18  12.10.18  More imaging in PACS   TESTS     PULMONARY FUNCTION TESTING (PFT) In process Scheduled for 3.7.19   FLOW LOOP VOLUME (FVL) In process    CYSTIC FIBROSIS     CF SPUTUM CULTURE N/A         Action    Action Taken 3.4.19 spoke with pt and he stated he hasn't been seen elsewhere for pneumonia. Also, asked if he has completed any chest imaging elsewhere. Pt denied. However, found outside imaging in PACS.  PARKER.  Requested CXR from 1.28.19 performed at [facility]. Pulled image from 1.28.19.

## 2019-03-07 ENCOUNTER — OFFICE VISIT (OUTPATIENT)
Dept: PULMONOLOGY | Facility: CLINIC | Age: 54
End: 2019-03-07
Payer: COMMERCIAL

## 2019-03-07 ENCOUNTER — PRE VISIT (OUTPATIENT)
Dept: PULMONOLOGY | Facility: CLINIC | Age: 54
End: 2019-03-07

## 2019-03-07 VITALS
RESPIRATION RATE: 18 BRPM | BODY MASS INDEX: 27.16 KG/M2 | HEART RATE: 57 BPM | DIASTOLIC BLOOD PRESSURE: 77 MMHG | SYSTOLIC BLOOD PRESSURE: 128 MMHG | OXYGEN SATURATION: 95 % | WEIGHT: 194 LBS | HEIGHT: 71 IN

## 2019-03-07 DIAGNOSIS — J44.9 STAGE 1 MILD COPD BY GOLD CLASSIFICATION (H): ICD-10-CM

## 2019-03-07 DIAGNOSIS — Z23 ENCOUNTER FOR VACCINATION: Primary | ICD-10-CM

## 2019-03-07 DIAGNOSIS — Z87.891 HISTORY OF SMOKING: ICD-10-CM

## 2019-03-07 DIAGNOSIS — J18.9 PNEUMONIA: Primary | ICD-10-CM

## 2019-03-07 PROCEDURE — 90732 PPSV23 VACC 2 YRS+ SUBQ/IM: CPT | Mod: ZF

## 2019-03-07 PROCEDURE — G0009 ADMIN PNEUMOCOCCAL VACCINE: HCPCS | Mod: ZF

## 2019-03-07 PROCEDURE — 25000128 H RX IP 250 OP 636: Mod: ZF

## 2019-03-07 PROCEDURE — G0463 HOSPITAL OUTPT CLINIC VISIT: HCPCS | Mod: ZF

## 2019-03-07 RX ADMIN — PNEUMOCOCCAL VACCINE POLYVALENT 0.5 ML
25; 25; 25; 25; 25; 25; 25; 25; 25; 25; 25; 25; 25; 25; 25; 25; 25; 25; 25; 25; 25; 25; 25 INJECTION, SOLUTION INTRAMUSCULAR; SUBCUTANEOUS at 08:45

## 2019-03-07 ASSESSMENT — MIFFLIN-ST. JEOR: SCORE: 1757.11

## 2019-03-07 ASSESSMENT — PAIN SCALES - GENERAL: PAINLEVEL: MILD PAIN (3)

## 2019-03-07 NOTE — NURSING NOTE
Given patient [id number] in right deltoid IM. Cleaned site with alcohol, and applied bandage. Pt tolerated injection well  Ana Dash CMA
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
## 2019-03-09 LAB
DLCOUNC-%PRED-PRE: 97 %
DLCOUNC-PRE: 28.99 ML/MIN/MMHG
DLCOUNC-PRED: 29.65 ML/MIN/MMHG
ERV-%PRED-PRE: 67 %
ERV-PRE: 0.92 L
ERV-PRED: 1.36 L
EXPTIME-PRE: 9.03 SEC
FEF2575-%PRED-PRE: 59 %
FEF2575-PRE: 2.11 L/SEC
FEF2575-PRED: 3.56 L/SEC
FEFMAX-%PRED-PRE: 87 %
FEFMAX-PRE: 8.7 L/SEC
FEFMAX-PRED: 9.89 L/SEC
FEV1-%PRED-PRE: 84 %
FEV1-PRE: 3.36 L
FEV1FEV6-PRE: 69 %
FEV1FEV6-PRED: 80 %
FEV1FVC-PRE: 67 %
FEV1FVC-PRED: 79 %
FEV1SVC-PRE: 71 %
FEV1SVC-PRED: 75 %
FIFMAX-PRE: 5.9 L/SEC
FVC-%PRED-PRE: 99 %
FVC-PRE: 5.01 L
FVC-PRED: 5.04 L
IC-%PRED-PRE: 98 %
IC-PRE: 3.85 L
IC-PRED: 3.93 L
VA-%PRED-PRE: 95 %
VA-PRE: 6.43 L
VC-%PRED-PRE: 90 %
VC-PRE: 4.77 L
VC-PRED: 5.29 L

## 2019-03-12 DIAGNOSIS — S42.002A CLOSED FRACTURE OF LEFT CLAVICLE: Primary | ICD-10-CM

## 2019-03-19 ENCOUNTER — OFFICE VISIT (OUTPATIENT)
Dept: INTERNAL MEDICINE | Facility: CLINIC | Age: 54
End: 2019-03-19
Payer: COMMERCIAL

## 2019-03-19 VITALS
RESPIRATION RATE: 16 BRPM | WEIGHT: 189.4 LBS | BODY MASS INDEX: 26.42 KG/M2 | SYSTOLIC BLOOD PRESSURE: 122 MMHG | OXYGEN SATURATION: 95 % | HEART RATE: 58 BPM | DIASTOLIC BLOOD PRESSURE: 75 MMHG

## 2019-03-19 DIAGNOSIS — H93.13 TINNITUS, BILATERAL: ICD-10-CM

## 2019-03-19 DIAGNOSIS — H90.5 SENSORINEURAL HEARING LOSS, UNILATERAL: ICD-10-CM

## 2019-03-19 DIAGNOSIS — Z59.9 FINANCIAL DIFFICULTIES: ICD-10-CM

## 2019-03-19 DIAGNOSIS — K21.9 GASTROESOPHAGEAL REFLUX DISEASE, ESOPHAGITIS PRESENCE NOT SPECIFIED: ICD-10-CM

## 2019-03-19 DIAGNOSIS — R41.3 MEMORY LOSS: Primary | ICD-10-CM

## 2019-03-19 SDOH — ECONOMIC STABILITY - INCOME SECURITY: PROBLEM RELATED TO HOUSING AND ECONOMIC CIRCUMSTANCES, UNSPECIFIED: Z59.9

## 2019-03-19 SDOH — ECONOMIC STABILITY: TRANSPORTATION INSECURITY
IN THE PAST 12 MONTHS, HAS THE LACK OF TRANSPORTATION KEPT YOU FROM MEDICAL APPOINTMENTS OR FROM GETTING MEDICATIONS?: NO

## 2019-03-19 SDOH — ECONOMIC STABILITY: INCOME INSECURITY: HOW HARD IS IT FOR YOU TO PAY FOR THE VERY BASICS LIKE FOOD, HOUSING, MEDICAL CARE, AND HEATING?: VERY HARD

## 2019-03-19 SDOH — ECONOMIC STABILITY: FOOD INSECURITY: WITHIN THE PAST 12 MONTHS, THE FOOD YOU BOUGHT JUST DIDN'T LAST AND YOU DIDN'T HAVE MONEY TO GET MORE.: SOMETIMES TRUE

## 2019-03-19 SDOH — ECONOMIC STABILITY: FOOD INSECURITY: WITHIN THE PAST 12 MONTHS, YOU WORRIED THAT YOUR FOOD WOULD RUN OUT BEFORE YOU GOT MONEY TO BUY MORE.: OFTEN TRUE

## 2019-03-19 ASSESSMENT — ENCOUNTER SYMPTOMS
CHILLS: 0
LEG PAIN: 0
ABDOMINAL PAIN: 0
SORE THROAT: 1
COUGH: 0
STIFFNESS: 0
SHORTNESS OF BREATH: 0
FATIGUE: 0
EYE PAIN: 0
FEVER: 0
WHEEZING: 0
DIARRHEA: 0
MUSCLE CRAMPS: 1
DYSPNEA ON EXERTION: 0
EXTREMITY NUMBNESS: 0
TREMORS: 1
HOARSE VOICE: 0
CONSTIPATION: 0
MEMORY LOSS: 1
JOINT SWELLING: 1
LEG SWELLING: 0

## 2019-03-19 ASSESSMENT — PAIN SCALES - GENERAL: PAINLEVEL: NO PAIN (0)

## 2019-03-19 NOTE — NURSING NOTE
Chief Complaint   Patient presents with     Establish Care     pt is here to establish care with new PCP     Memory Loss     pt is here to discuss memory loss        Cherelle Arce CMA at 8:29 AM on 3/19/2019

## 2019-03-19 NOTE — PATIENT INSTRUCTIONS
Primary Care Center Phone Number [phone]  Primary Care Center Medication Refill Request Information:  * Please contact your pharmacy regarding ANY request for medication refills.  ** [facility] Prescription Fax = [phone]  * Please allow 3 business days for routine medication refills.  * Please allow 5 business days for controlled substance medication refills.     Primary Care Center Test Result notification information:  *You will be notified with in 7-10 days of your appointment day regarding the results of your test.  If you are on MyChart you will be notified as soon as the provider has reviewed the results and signed off on them.               [facility]         Internal Medicine Resident                   Continuity Clinic    Who We Are    Resident Continuity Clinic is a part of the [facility].  Resident physicians see patients independently and establish a relationship with them over the course of their three-year residency program.  As with the Primary Care Clinic, our Resident Continuity Clinic models a group practice.  If your doctor is not available, you will be able to see another resident physician.  At the end of a resident s training, patients will be transitioned to a new resident physician for ongoing care.     We treat patients with a wide array of medical needs from routine physicals, to acute illnesses, to diabetes and blood pressure management, to complex medical illness.  What is a Resident Physician?    Resident physicians hold medical degrees and are doctors. They are training to become specialists in Internal Medicine. They work under the supervision of board-certified faculty physicians.  Expectations for Your Care    We strive to provide accessible, quality care at all times.    In order to provide this care, it is best to see your primary care resident doctor consistently rather switching between providers.  In the event you do see another physician, you  should schedule a follow-up visit with your usual primary care doctor.    If you are transitioning your care from another clinic, it is helpful to have your records available for your doctor to review.    We do not prescribe controlled substances, such as ADD medications or narcotic pain medications, on your first visit.  We will review your health records and concerns prior to devising a treatment plan with you in order to provide the best care.      Clinic Services     Extended clinic hours; patient  to help navigate your visit;  parking; laboratory and imaging services with evening and weekend hours    Multiple medical and surgical specialties in one building    Complementary services, including Nutrition, Integrative Medicine, Pharmacy consultations, Mental and Behavioral Health, Sports Medicine and Physical Therapy    Thank You    We would like to thank you for choosing the [facility] Internal Medicine Resident Continuity Clinic for your primary care. You are making a priceless contribution to the training of the next generation of health care practitioners.     Contact us at [phone] for appointments or questions.    Resident Clinic Hours are Tuesdays and Thursdays, 7:30am-5:00pm    Residents   Alon Bansal MD  (Male)   Qing Rivera MD (Female)  Cherelle Deutsch MD   (Female)   Mine Francois MD   (Female)   Dre May MD  (Male)   Jeovany Rivas MD  (Male)   Shlomo Goddard MD    (Female)   Ulysses Guzman MD  (Male)   Jason Regalado MD  (Male)    Felicita Vu MD  (Female)   Zeeshan Olivera MD  (Male)   Didi Narvaez MD  (Female)   Rosi Cisse MD   (Female)   Joni Simon MD  (Male)   Aneesh Myrick MD  (Male)   Jeovany Pelletier MD (Male)   Jessee Fried MD  (Male)   Jayne Osuna MD (Female)    Adriana Martinez MD (Female)   Rossy Drake MD  (Male)   Stacie Boothe MD    (Female)   Nena Buchanan MD  (Female)    Supervising Physicians    Heike Mitchell, MD Christine Alba, MD Daniel Jo, MD Burke Warren, MD Selene Escobar, MD Keli Adams, MD Alok Rodriguez, MD Vale Castellanos MD          Neuropsych Memory Testing [phone]
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
## 2019-03-20 ENCOUNTER — OFFICE VISIT (OUTPATIENT)
Dept: ORTHOPEDICS | Facility: CLINIC | Age: 54
End: 2019-03-20
Payer: COMMERCIAL

## 2019-03-20 ENCOUNTER — HOSPITAL ENCOUNTER (OUTPATIENT)
Dept: MRI IMAGING | Facility: CLINIC | Age: 54
Discharge: HOME | End: 2019-03-20
Attending: INTERNAL MEDICINE | Admitting: INTERNAL MEDICINE
Payer: COMMERCIAL

## 2019-03-20 ENCOUNTER — ANCILLARY PROCEDURE (OUTPATIENT)
Dept: GENERAL RADIOLOGY | Facility: CLINIC | Age: 54
End: 2019-03-20
Attending: ORTHOPAEDIC SURGERY
Payer: COMMERCIAL

## 2019-03-20 DIAGNOSIS — R41.3 MEMORY LOSS: ICD-10-CM

## 2019-03-20 DIAGNOSIS — S42.002D CLOSED DISPLACED FRACTURE OF LEFT CLAVICLE WITH ROUTINE HEALING, UNSPECIFIED PART OF CLAVICLE, SUBSEQUENT ENCOUNTER: Primary | ICD-10-CM

## 2019-03-20 DIAGNOSIS — S42.002A CLOSED FRACTURE OF LEFT CLAVICLE: ICD-10-CM

## 2019-03-20 PROCEDURE — 70551 MRI BRAIN STEM W/O DYE: CPT

## 2019-03-20 NOTE — LETTER
Quang Carolina     1965  Encounter date/time:  03/20/19   [id number]    Report of Workability    Physician:  Daya Leach MD    Diagnosis:  Left shoulder/chest injury    Limitations:  Progress strengthening gradually over next 4 weeks    Return to work status: Return to work with NO limitations starting 4/22/19.    Follow-Up:   Return to clinic in 2 months.
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
## 2019-03-20 NOTE — PROGRESS NOTES
CHIEF CONCERN: Status post ORIF left comminuted midshaft clavicle fracture  DATE OF SURGERY: 12/26/18    HISTORY:  Mr. Carolina is a 53 year old male who is 3 months s/p the above procedure. He notes he feels good, is wanting to progress his strengthening. He has stopped smoking. 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     EXAM:   General: Awake, Alert, and oriented. Articulates and communicates with a normal affect   Respirations even and unlabored   Left Upper Extremity:  AROM of shoulder flexion to 170 degrees and ER at side to 60.    Imaging:   Clavicle x-rays today demonstrates progressive increase in callus/resolution of clavicle fracture line    ASSESSMENT:  1. Three months s/p ORIF left clavicle fracture and resolving pleural effusion.     PLAN:   - Patient to progress to formal strengthening/weight lifting, gradually increasing weight   - Work note provided for return in 1 month  - Return in 2 months.

## 2019-03-20 NOTE — LETTER
RE: Quang Carolina  [address]  [place] [address]     Dear Colleague,    Thank you for referring your patient, Quang Carolina, to the [facility]. Please see a copy of my visit note below.    CHIEF CONCERN: Status post ORIF left comminuted midshaft clavicle fracture  DATE OF SURGERY: 12/26/18    HISTORY:  Mr. Carolina is a 53 year old male who is 3 months s/p the above procedure. He notes he feels good, is wanting to progress his strengthening. He has stopped smoking. 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     EXAM:   General: Awake, Alert, and oriented. Articulates and communicates with a normal affect   Respirations even and unlabored   Left Upper Extremity:  AROM of shoulder flexion to 170 degrees and ER at side to 60.    Imaging:   Clavicle x-rays today demonstrates progressive increase in callus/resolution of clavicle fracture line    ASSESSMENT:  1. Three months s/p ORIF left clavicle fracture and resolving pleural effusion.     PLAN:   - Patient to progress to formal strengthening/weight lifting, gradually increasing weight   - Work note provided for return in 1 month  - Return in 2 months.    Again, thank you for allowing me to participate in the care of your patient.      Sincerely,    Daya Leach MD

## 2019-03-22 ENCOUNTER — PATIENT OUTREACH (OUTPATIENT)
Dept: CARE COORDINATION | Facility: CLINIC | Age: 54
End: 2019-03-22

## 2019-03-22 NOTE — PROGRESS NOTES
Social Work Telephone Message Note  [facility]    Patient Name:  Quang Carolina  /Age:  1965 (53 year old)    Referral Source: PCP PCC  Reason for Referral:  Financial issues and food insecurity     contacted Patient via telephone on 3/19/2019 and 3/21/2019. Messages were left on Patient's voicemail to be called back.  will await Patient's return phone call and will provide assistance at that time.    LINDA Sainz, [facility]    [facility]  [phone]

## 2019-04-11 NOTE — TELEPHONE ENCOUNTER
FUTURE VISIT INFORMATION      FUTURE VISIT INFORMATION:    Date: 4/18/19    Time: 2PM (Audio)/ 3PM (ENT)    [place] [facility]  REFERRAL INFORMATION:    Referring provider:  Rosi Cisse MD    Referring providers clinic:  [facility]    Reason for visit/diagnosis  Tinnitus, bilateral / Sensorineural hearing loss, unilateral     RECORDS REQUESTED FROM:       Clinic name Comments Records Status Imaging Status    [facility] 3/19/19 notes with Dr Cisse [facility]    [facility]   [facility] 10/03/18 notes with Fredi Garcia MD      11/12/18, 10/17/18 notes with Mary Lou Oneal, AuD      10/3/18 notes with Selene Mccann M.S. CCC-A *only visit with audiogram scanned in, graph will be faxed to clinic [facility] Everywhere    [facility] Emergency Department   9/12/18 notes with Wyatt Morales MD  Care Everywhere    [facility] 3/20/19 MR BRain   12/8/18 CT HEad  [place] PACS                 4/11/19 12:59PM sent a fax to [facility] for audios - Amay   4/17/19 10AM called [facility] for audios req on 4/11. Records will be faxed to clinic shortly. The only audio they have on file is 10/3/18 graph - amay

## 2019-04-18 ENCOUNTER — PRE VISIT (OUTPATIENT)
Dept: OTOLARYNGOLOGY | Facility: CLINIC | Age: 54
End: 2019-04-18

## 2019-04-18 DIAGNOSIS — H91.90 HEARING LOSS, UNSPECIFIED HEARING LOSS TYPE, UNSPECIFIED LATERALITY: Primary | ICD-10-CM

## 2019-08-21 ENCOUNTER — TRANSFERRED RECORDS (OUTPATIENT)
Dept: HEALTH INFORMATION MANAGEMENT | Facility: CLINIC | Age: 54
End: 2019-08-21

## 2019-08-21 LAB
ALT SERPL-CCNC: 57 IU/L (ref 8–45)
AST SERPL-CCNC: 105 IU/L (ref 2–40)
CREAT SERPL-MCNC: 1.03 MG/DL (ref 0.72–1.25)
GFR SERPL CREATININE-BSD FRML MDRD: >60 ML/MIN/1.73M2
GLUCOSE SERPL-MCNC: 123 MG/DL (ref 65–100)
POTASSIUM SERPL-SCNC: 4 MMOL/L (ref 3.5–5)

## 2023-12-26 NOTE — ANESTHESIA POSTPROCEDURE EVALUATION
SURGERY PROGRESS NOTE  Viral Damon  ADMISSION DATE:  12/11/2023  DATE:  12/26/2023  CURRENT HOSPITAL DAY:  Hospital Day: 16  SURGEON:  uD White MD       General surgery progress note    Status post debridement stage IV sacral decubitus ulcer being followed by infectious disease and wound service no acute general surgery issue at this time.  Stable from general surgery standpoint.  Du White MD     Anesthesia POST Procedure Evaluation    Patient: Quang Carolina   MRN:     [id number] Gender:   male   Age:    53 year old :      1965        Preoperative Diagnosis: Left Clavicle Fracture   Procedure(s):  OPEN REDUCTION INTERNAL FIXATION CLAVICLE LEFT   Postop Comments: No value filed.       Anesthesia Type:  General, Regional    Reportable Event: NO     PAIN: Uncomplicated   Sign Out status: Comfortable, Well controlled pain     PONV:   Sign Out status  No Nausea or Vomiting     Neuro/Psych:   Sign Out Status: Age appropriate mentation     Airway/Resp.:   Sign Out Status: Other     Breathing: Decreased BS     O2 Requirements: Face mask (CXR with LLL white out.  Increased O2 requirement.)     CV:   Sign Out status: Appropriate BP and perfusion indices; Appropriate HR/Rhythm     Disposition:   Sign Out in:  PACU  Disposition:  Floor; Disposition CHANGED from PREOP PLAN  Recovery Course: As described above     Comments/Narrative:  See Quick note.  Pt to [facility] for LLL whiteout and increased O2 needs.  d           Last Anesthesia Record Vitals:  CRNA VITALS  2018 1703 - 2018 1803      2018             NIBP:  117/70    Pulse:  86    Temp:  36.4  C (97.5  F)    SpO2:  92 %    Resp Rate (observed):  10          Last PACU/Preop Vitals:  Vitals:    18 2200 18 2300 18 0000   BP: 122/83 123/76 120/77   Pulse: 73 63 85   Resp:  16    Temp: 36.6  C (97.8  F)     SpO2:  95% 94%         Electronically Signed By: Selene Bradshaw MD, 2018, 12:18 AM

## (undated) DEVICE — GLOVE PROTEXIS POWDER FREE 7.0 ORTHOPEDIC 2D73ET70

## (undated) DEVICE — TAPE MICROFOAM 4" 1528-4

## (undated) DEVICE — SUCTION MANIFOLD DORNOCH ULTRA CART UL-CL500

## (undated) DEVICE — DRSG AQUACEL AG 4X4.7" 403765

## (undated) DEVICE — SOL HYDROGEN PEROXIDE 3% 4OZ BOTTLE F0010

## (undated) DEVICE — ESU ELEC NDL 1" E1552

## (undated) DEVICE — STRAP KNEE/BODY 31143004

## (undated) DEVICE — IMM KIT SHOULDER STABILIZATION 7210573

## (undated) DEVICE — Device

## (undated) DEVICE — NDL 22GA 1.5"

## (undated) DEVICE — SOL WATER IRRIG 1000ML BOTTLE 2F7114

## (undated) DEVICE — SYR BULB IRRIG 50ML LATEX FREE 0035280

## (undated) DEVICE — SU MONOCRYL 2-0 SH 27" UND Y417H

## (undated) DEVICE — SU VICRYL 0 CT 36" J358H

## (undated) DEVICE — POSITIONER ARMBOARD FOAM 1PAIR LF FP-ARMB1

## (undated) DEVICE — SOL NACL 0.9% IRRIG 1000ML BOTTLE 2F7124

## (undated) DEVICE — PREP DURAPREP 26ML APL 8630

## (undated) DEVICE — LINEN ORTHO PACK 5446

## (undated) DEVICE — GOWN XLG DISP 9545

## (undated) DEVICE — DRSG STERI STRIP 1/2X4" R1547

## (undated) DEVICE — ESU GROUND PAD ADULT W/CORD E7507

## (undated) DEVICE — DRSG AQUACEL AG 3.5X9.75" HYDROFIBER 412011

## (undated) DEVICE — GLOVE PROTEXIS W/NEU-THERA 7.5  2D73TE75

## (undated) DEVICE — SU VICRYL 2-0 CT-2 27" UND J269H

## (undated) DEVICE — GLOVE PROTEXIS POWDER FREE 7.5 ORTHOPEDIC 2D73ET75

## (undated) DEVICE — SPONGE LAP 18X18" X8435

## (undated) DEVICE — DRAPE U-DRAPE 1015NSD NON-STERILE

## (undated) DEVICE — SU MONOCRYL 3-0 PS-2 18" UND Y497G

## (undated) DEVICE — LINEN TOWEL PACK X5 5464

## (undated) DEVICE — GLOVE PROTEXIS W/NEU-THERA 7.0  2D73TE70

## (undated) DEVICE — ESU PENCIL W/SMOKE EVAC NEPTUNE STRYKER 0703-046-000

## (undated) DEVICE — COVER CAMERA IN-LIGHT DISP LT-C02

## (undated) DEVICE — ESU CLEANER TIP 31142717

## (undated) DEVICE — DRAPE C-ARM OEC MINI VIEW 6800   00-901917-01

## (undated) RX ORDER — ONDANSETRON 2 MG/ML
INJECTION INTRAMUSCULAR; INTRAVENOUS
Status: DISPENSED
Start: 2018-12-26

## (undated) RX ORDER — ALBUTEROL SULFATE 0.83 MG/ML
SOLUTION RESPIRATORY (INHALATION)
Status: DISPENSED
Start: 2019-03-07

## (undated) RX ORDER — ACETAMINOPHEN 325 MG/1
TABLET ORAL
Status: DISPENSED
Start: 2018-12-26

## (undated) RX ORDER — SODIUM CHLORIDE 9 MG/ML
INJECTION, SOLUTION INTRAVENOUS
Status: DISPENSED
Start: 2018-12-26

## (undated) RX ORDER — LIDOCAINE HYDROCHLORIDE 10 MG/ML
INJECTION, SOLUTION INFILTRATION; PERINEURAL
Status: DISPENSED
Start: 2018-12-27

## (undated) RX ORDER — BUPIVACAINE HYDROCHLORIDE 2.5 MG/ML
INJECTION, SOLUTION INFILTRATION; PERINEURAL
Status: DISPENSED
Start: 2018-12-26

## (undated) RX ORDER — CEFAZOLIN SODIUM 2 G/100ML
INJECTION, SOLUTION INTRAVENOUS
Status: DISPENSED
Start: 2018-12-26

## (undated) RX ORDER — SODIUM CHLORIDE, SODIUM LACTATE, POTASSIUM CHLORIDE, CALCIUM CHLORIDE 600; 310; 30; 20 MG/100ML; MG/100ML; MG/100ML; MG/100ML
INJECTION, SOLUTION INTRAVENOUS
Status: DISPENSED
Start: 2018-12-26

## (undated) RX ORDER — FENTANYL CITRATE 50 UG/ML
INJECTION, SOLUTION INTRAMUSCULAR; INTRAVENOUS
Status: DISPENSED
Start: 2018-12-26

## (undated) RX ORDER — GABAPENTIN 300 MG/1
CAPSULE ORAL
Status: DISPENSED
Start: 2018-12-26

## (undated) RX ORDER — LIDOCAINE HYDROCHLORIDE 20 MG/ML
INJECTION, SOLUTION EPIDURAL; INFILTRATION; INTRACAUDAL; PERINEURAL
Status: DISPENSED
Start: 2018-12-26

## (undated) RX ORDER — PHENYLEPHRINE HCL IN 0.9% NACL 1 MG/10 ML
SYRINGE (ML) INTRAVENOUS
Status: DISPENSED
Start: 2018-12-26